# Patient Record
Sex: MALE | Race: WHITE | Employment: FULL TIME | URBAN - NONMETROPOLITAN AREA
[De-identification: names, ages, dates, MRNs, and addresses within clinical notes are randomized per-mention and may not be internally consistent; named-entity substitution may affect disease eponyms.]

---

## 2017-08-21 ENCOUNTER — HOSPITAL ENCOUNTER (EMERGENCY)
Age: 32
Discharge: HOME | End: 2017-08-21
Attending: EMERGENCY MEDICINE
Payer: COMMERCIAL

## 2017-08-21 VITALS
SYSTOLIC BLOOD PRESSURE: 136 MMHG | HEIGHT: 67 IN | HEART RATE: 110 BPM | WEIGHT: 205 LBS | DIASTOLIC BLOOD PRESSURE: 71 MMHG | BODY MASS INDEX: 32.18 KG/M2 | RESPIRATION RATE: 16 BRPM | TEMPERATURE: 98.1 F | OXYGEN SATURATION: 97 %

## 2017-08-21 DIAGNOSIS — H04.303: Primary | ICD-10-CM

## 2017-08-21 PROCEDURE — 99282 EMERGENCY DEPT VISIT SF MDM: CPT

## 2017-08-21 RX ORDER — GENTAMICIN SULFATE 3 MG/ML
1 SOLUTION/ DROPS OPHTHALMIC 4 TIMES DAILY
Qty: 1 BOTTLE | Refills: 0 | Status: SHIPPED | OUTPATIENT
Start: 2017-08-21 | End: 2017-08-31

## 2017-08-21 ASSESSMENT — ENCOUNTER SYMPTOMS
FACIAL SWELLING: 1
EYE DISCHARGE: 1
VOMITING: 0
COUGH: 0
SHORTNESS OF BREATH: 0
RHINORRHEA: 0
ABDOMINAL PAIN: 0
SORE THROAT: 0
BACK PAIN: 0
DIARRHEA: 0
NAUSEA: 0
EYE REDNESS: 1
WHEEZING: 0

## 2017-08-21 ASSESSMENT — VISUAL ACUITY
OD: 20/30
OU: 20/25
OS: 20/50

## 2018-03-12 ENCOUNTER — HOSPITAL ENCOUNTER (EMERGENCY)
Age: 33
Discharge: HOME | End: 2018-03-12
Payer: COMMERCIAL

## 2018-03-12 VITALS
TEMPERATURE: 97.7 F | WEIGHT: 217 LBS | OXYGEN SATURATION: 98 % | SYSTOLIC BLOOD PRESSURE: 123 MMHG | DIASTOLIC BLOOD PRESSURE: 81 MMHG | HEIGHT: 68 IN | HEART RATE: 68 BPM | RESPIRATION RATE: 16 BRPM | BODY MASS INDEX: 32.89 KG/M2

## 2018-03-12 DIAGNOSIS — H10.10 ALLERGIC CONJUNCTIVITIS AND RHINITIS, UNSPECIFIED LATERALITY: Primary | ICD-10-CM

## 2018-03-12 DIAGNOSIS — J30.9 ALLERGIC CONJUNCTIVITIS AND RHINITIS, UNSPECIFIED LATERALITY: Primary | ICD-10-CM

## 2018-03-12 LAB
BILIRUBIN URINE: NEGATIVE
BLOOD, URINE: NEGATIVE
CHARACTER, URINE: CLEAR
COLOR: YELLOW
GLUCOSE, URINE: NEGATIVE MG/DL
KETONES, URINE: NEGATIVE
LEUKOCYTES, UA: NEGATIVE
NITRATE, UA: NEGATIVE
PH UA: 7.5 (ref 5–9)
PROTEIN UA: NEGATIVE MG/DL
REFLEX TO URINE C & S: NORMAL
SPECIFIC GRAVITY UA: 1.02 (ref 1–1.03)
UROBILINOGEN, URINE: 0.2 EU/DL (ref 0–1)

## 2018-03-12 PROCEDURE — 99213 OFFICE O/P EST LOW 20 MIN: CPT

## 2018-03-12 PROCEDURE — 99213 OFFICE O/P EST LOW 20 MIN: CPT | Performed by: NURSE PRACTITIONER

## 2018-03-12 PROCEDURE — 81003 URINALYSIS AUTO W/O SCOPE: CPT

## 2018-03-12 RX ORDER — AZELASTINE HYDROCHLORIDE 0.5 MG/ML
1 SOLUTION/ DROPS OPHTHALMIC 2 TIMES DAILY
Qty: 1 BOTTLE | Refills: 1 | Status: SHIPPED | OUTPATIENT
Start: 2018-03-12 | End: 2018-04-11

## 2018-03-12 RX ORDER — CETIRIZINE HYDROCHLORIDE 10 MG/1
10 TABLET ORAL DAILY
Qty: 30 TABLET | Refills: 0 | Status: SHIPPED | OUTPATIENT
Start: 2018-03-12 | End: 2018-04-11

## 2018-03-12 ASSESSMENT — ENCOUNTER SYMPTOMS
EYE ITCHING: 0
BLIND SPOTS: 0
CHEST TIGHTNESS: 0
EYE INFLAMMATION: 1
SINUS PRESSURE: 0
NAUSEA: 0
WHEEZING: 0
CRUSTING: 1
EYE DISCHARGE: 0
DOUBLE VISION: 0
PERI-ORBITAL EDEMA: 1
SHORTNESS OF BREATH: 0
EYE WATERING: 1
PHOTOPHOBIA: 1
COUGH: 0
ABDOMINAL PAIN: 0
CHOKING: 0
BLURRED VISION: 0
EYE REDNESS: 0
STRIDOR: 0
VOMITING: 0
SINUS PAIN: 0
RHINORRHEA: 1

## 2019-02-25 ENCOUNTER — HOSPITAL ENCOUNTER (EMERGENCY)
Age: 34
Discharge: HOME | End: 2019-02-25
Payer: COMMERCIAL

## 2019-02-25 VITALS
BODY MASS INDEX: 30.61 KG/M2 | DIASTOLIC BLOOD PRESSURE: 72 MMHG | SYSTOLIC BLOOD PRESSURE: 133 MMHG | TEMPERATURE: 98.4 F | WEIGHT: 195 LBS | OXYGEN SATURATION: 96 % | RESPIRATION RATE: 16 BRPM | HEIGHT: 67 IN | HEART RATE: 73 BPM

## 2019-02-25 DIAGNOSIS — J06.9 ACUTE UPPER RESPIRATORY INFECTION: Primary | ICD-10-CM

## 2019-02-25 DIAGNOSIS — H93.8X1 CONGESTION OF RIGHT EAR: ICD-10-CM

## 2019-02-25 PROCEDURE — 99213 OFFICE O/P EST LOW 20 MIN: CPT | Performed by: NURSE PRACTITIONER

## 2019-02-25 PROCEDURE — 99212 OFFICE O/P EST SF 10 MIN: CPT

## 2019-02-25 RX ORDER — GUAIFENESIN 600 MG/1
1200 TABLET, EXTENDED RELEASE ORAL 2 TIMES DAILY
Qty: 40 TABLET | Refills: 0 | Status: SHIPPED | OUTPATIENT
Start: 2019-02-25 | End: 2019-03-07

## 2019-02-25 RX ORDER — LORATADINE 10 MG/1
10 TABLET ORAL DAILY
Qty: 30 TABLET | Refills: 0 | Status: SHIPPED | OUTPATIENT
Start: 2019-02-25 | End: 2019-07-08

## 2019-02-25 RX ORDER — DEXTROMETHORPHAN HYDROBROMIDE AND PROMETHAZINE HYDROCHLORIDE 15; 6.25 MG/5ML; MG/5ML
5 SYRUP ORAL 4 TIMES DAILY PRN
Qty: 120 ML | Refills: 0 | Status: SHIPPED | OUTPATIENT
Start: 2019-02-25 | End: 2019-03-04

## 2019-02-25 ASSESSMENT — ENCOUNTER SYMPTOMS
COUGH: 1
RHINORRHEA: 1
DIARRHEA: 0
NAUSEA: 0
WHEEZING: 0
SINUS PRESSURE: 1
VOMITING: 0
COLOR CHANGE: 0
SINUS PAIN: 0
SORE THROAT: 1
FACIAL SWELLING: 0
TROUBLE SWALLOWING: 0
SHORTNESS OF BREATH: 0

## 2019-07-08 ENCOUNTER — HOSPITAL ENCOUNTER (EMERGENCY)
Age: 34
Discharge: HOME | End: 2019-07-08
Payer: COMMERCIAL

## 2019-07-08 VITALS
OXYGEN SATURATION: 98 % | WEIGHT: 200 LBS | RESPIRATION RATE: 18 BRPM | BODY MASS INDEX: 31.39 KG/M2 | DIASTOLIC BLOOD PRESSURE: 94 MMHG | SYSTOLIC BLOOD PRESSURE: 154 MMHG | HEART RATE: 99 BPM | HEIGHT: 67 IN | TEMPERATURE: 97.7 F

## 2019-07-08 DIAGNOSIS — H92.01 OTALGIA OF RIGHT EAR: Primary | ICD-10-CM

## 2019-07-08 PROCEDURE — 6370000000 HC RX 637 (ALT 250 FOR IP): Performed by: NURSE PRACTITIONER

## 2019-07-08 PROCEDURE — 99282 EMERGENCY DEPT VISIT SF MDM: CPT

## 2019-07-08 RX ORDER — CETIRIZINE HYDROCHLORIDE 10 MG/1
10 TABLET ORAL DAILY
Qty: 30 TABLET | Refills: 0 | Status: SHIPPED | OUTPATIENT
Start: 2019-07-08 | End: 2021-09-21

## 2019-07-08 RX ORDER — NAPROXEN 500 MG/1
500 TABLET ORAL 2 TIMES DAILY
Qty: 60 TABLET | Refills: 0 | Status: SHIPPED | OUTPATIENT
Start: 2019-07-08 | End: 2021-09-21

## 2019-07-08 RX ORDER — NAPROXEN 250 MG/1
TABLET ORAL
Status: DISPENSED
Start: 2019-07-08 | End: 2019-07-08

## 2019-07-08 RX ORDER — NAPROXEN 250 MG/1
500 TABLET ORAL ONCE
Status: COMPLETED | OUTPATIENT
Start: 2019-07-08 | End: 2019-07-08

## 2019-07-08 RX ADMIN — NAPROXEN 500 MG: 250 TABLET ORAL at 03:04

## 2019-07-08 ASSESSMENT — ENCOUNTER SYMPTOMS
RHINORRHEA: 0
COUGH: 0
TROUBLE SWALLOWING: 0
SORE THROAT: 0
WHEEZING: 0
SHORTNESS OF BREATH: 0
CHEST TIGHTNESS: 0

## 2019-07-08 ASSESSMENT — PAIN DESCRIPTION - LOCATION: LOCATION: EAR

## 2019-07-08 ASSESSMENT — PAIN DESCRIPTION - ORIENTATION: ORIENTATION: RIGHT

## 2019-07-08 ASSESSMENT — PAIN SCALES - GENERAL
PAINLEVEL_OUTOF10: 4
PAINLEVEL_OUTOF10: 4

## 2019-07-08 NOTE — ED PROVIDER NOTES
[address] [facility] COMPLAINT       Chief Complaint   Patient presents with    Otalgia       Nurses Notes reviewed and I agree except as noted in the HPI. HISTORY OF PRESENT ILLNESS    Deirdre Mao is a 35 y.o. male who presents for bilateral ear pain right worse than left. She states that he has had an ear infection for the last 6 months. States that he was seen at urgent care and they placed him on Mucinex for his ear infection. Additionally patient states that he cannot get a PCP because he does not like going to the doctor during the day. And secondary to this he would like a prescription for Chantix. A record review shows that the patient was not diagnosed with an ear infection he was diagnosed with a middle ear effusion he was given the Mucinex for chest congestion and he was started on Claritin for the ear effusion. REVIEW OFSYSTEMS     Review of Systems   Constitutional: Negative for fever. HENT: Positive for ear pain. Negative for congestion, rhinorrhea, sore throat and trouble swallowing. Respiratory: Negative for cough, chest tightness, shortness of breath and wheezing. Cardiovascular: Negative for chest pain and palpitations. Musculoskeletal: Negative for arthralgias and myalgias. Neurological: Negative for headaches. Hematological: Does not bruise/bleed easily. PAST MEDICAL HISTORY    has a past medical history of Diverticular disease and Diverticulitis. SURGICAL HISTORY      has a past surgical history that includes cyst removal; other surgical history (6/11/14); and Ureter stent placement (6-11-14). CURRENTMEDICATIONS       Previous Medications    No medications on file       ALLERGIES     has No Known Allergies. FAMILY HISTORY     indicated that his mother is alive. He indicated that his father is alive. family history includes Other in his father. SOCIAL HISTORY      reports that he has been smoking cigarettes.   He

## 2019-08-02 ENCOUNTER — OFFICE VISIT (OUTPATIENT)
Dept: FAMILY MEDICINE CLINIC | Age: 34
End: 2019-08-02
Payer: COMMERCIAL

## 2019-08-02 VITALS
OXYGEN SATURATION: 97 % | DIASTOLIC BLOOD PRESSURE: 77 MMHG | HEART RATE: 70 BPM | HEIGHT: 66 IN | TEMPERATURE: 97.6 F | SYSTOLIC BLOOD PRESSURE: 129 MMHG | WEIGHT: 211.6 LBS | BODY MASS INDEX: 34.01 KG/M2

## 2019-08-02 DIAGNOSIS — R73.9 HYPERGLYCEMIA: ICD-10-CM

## 2019-08-02 DIAGNOSIS — Z11.4 SCREENING FOR HIV (HUMAN IMMUNODEFICIENCY VIRUS): ICD-10-CM

## 2019-08-02 DIAGNOSIS — F17.200 TOBACCO DEPENDENCE: ICD-10-CM

## 2019-08-02 DIAGNOSIS — H66.001 NON-RECURRENT ACUTE SUPPURATIVE OTITIS MEDIA OF RIGHT EAR WITHOUT SPONTANEOUS RUPTURE OF TYMPANIC MEMBRANE: Primary | ICD-10-CM

## 2019-08-02 DIAGNOSIS — Z28.9: ICD-10-CM

## 2019-08-02 PROCEDURE — 99203 OFFICE O/P NEW LOW 30 MIN: CPT | Performed by: FAMILY MEDICINE

## 2019-08-02 RX ORDER — VARENICLINE TARTRATE 1 MG/1
1 TABLET, FILM COATED ORAL 2 TIMES DAILY
Qty: 60 TABLET | Refills: 1 | Status: SHIPPED | OUTPATIENT
Start: 2019-08-02 | End: 2021-09-21

## 2019-08-02 RX ORDER — AMOXICILLIN AND CLAVULANATE POTASSIUM 875; 125 MG/1; MG/1
1 TABLET, FILM COATED ORAL 2 TIMES DAILY
Qty: 20 TABLET | Refills: 0 | Status: SHIPPED | OUTPATIENT
Start: 2019-08-02 | End: 2019-08-12

## 2019-08-02 RX ORDER — VARENICLINE TARTRATE 0.5 MG/1
.5-1 TABLET, FILM COATED ORAL SEE ADMIN INSTRUCTIONS
Qty: 57 TABLET | Refills: 0 | Status: SHIPPED | OUTPATIENT
Start: 2019-08-02 | End: 2021-09-21

## 2019-08-02 ASSESSMENT — ENCOUNTER SYMPTOMS
RESPIRATORY NEGATIVE: 1
NAUSEA: 1
ABDOMINAL PAIN: 1
SORE THROAT: 1
EYES NEGATIVE: 1

## 2019-08-02 ASSESSMENT — PATIENT HEALTH QUESTIONNAIRE - PHQ9
SUM OF ALL RESPONSES TO PHQ QUESTIONS 1-9: 0
SUM OF ALL RESPONSES TO PHQ9 QUESTIONS 1 & 2: 0
SUM OF ALL RESPONSES TO PHQ QUESTIONS 1-9: 0
2. FEELING DOWN, DEPRESSED OR HOPELESS: 0
1. LITTLE INTEREST OR PLEASURE IN DOING THINGS: 0

## 2019-08-02 NOTE — PROGRESS NOTES
2019    Gael Arciniega (:  1985) is a 35 y.o. male, here for evaluation of the following medical concerns:    HPI  Ear pain continuous for the past 1-2 months. Right worse than left. + pressure. + nasal congestion. + seasonal allergy issues. Smoking 1 Ppd. Wants to quit. chantix worked well in the past.      Hyperglycemia noted on labs in the past,. H/o diverticular disease with recent 2 hour daily episodes of LLQ pain. Review of Systems   Constitutional: Negative. HENT: Positive for congestion, ear discharge, ear pain and sore throat. Negative for dental problem. Eyes: Negative. Respiratory: Negative. Cardiovascular: Negative. Gastrointestinal: Positive for abdominal pain and nausea. Genitourinary: Negative. Musculoskeletal: Negative. Skin: Negative. Neurological: Negative. Psychiatric/Behavioral: Negative. Prior to Visit Medications    Medication Sig Taking?  Authorizing Provider   varenicline (CHANTIX) 0.5 MG tablet Take 1-2 tablets by mouth See Admin Instructions 0.5mg DAILY for 3 days followed by 0.5mg TWICE DAILY for 4 days followed by 1mg TWICE DAILY Yes Mariposa Lutz DO   varenicline (CHANTIX) 1 MG tablet Take 1 tablet by mouth 2 times daily Yes Mariposa Lutz DO   amoxicillin-clavulanate (AUGMENTIN) 875-125 MG per tablet Take 1 tablet by mouth 2 times daily for 10 days Yes Mariposa Lutz DO   naproxen (NAPROSYN) 500 MG tablet Take 1 tablet by mouth 2 times daily  Patient not taking: Reported on 2019  JAMI Marquez CNP   cetirizine (ZYRTEC) 10 MG tablet Take 1 tablet by mouth daily  Patient not taking: Reported on 2019  JAMI Marquez CNP        No Known Allergies    Past Medical History:   Diagnosis Date    Diverticular disease 2008    Diverticulitis        Past Surgical History:   Procedure Laterality Date    CYST REMOVAL      neck    OTHER SURGICAL HISTORY  14    Robot Assisted Sigmoid Colectomy,

## 2019-08-02 NOTE — PROGRESS NOTES
Health Maintenance Due   Topic Date Due    Pneumococcal 0-64 years Vaccine (1 of 1 - PPSV23) 09/25/1991 REFUSED     Varicella Vaccine (1 of 2 - 13+ 2-dose series) 09/25/1998 PENDING     HIV screen  09/25/2000 PENDING     DTaP/Tdap/Td vaccine (1 - Tdap) 09/25/2004 REFSUED

## 2019-09-04 RX ORDER — VARENICLINE TARTRATE 0.5 MG/1
.5-1 TABLET, FILM COATED ORAL SEE ADMIN INSTRUCTIONS
Qty: 57 TABLET | Refills: 0 | OUTPATIENT
Start: 2019-09-04

## 2020-01-06 ENCOUNTER — TELEPHONE (OUTPATIENT)
Dept: FAMILY MEDICINE CLINIC | Age: 35
End: 2020-01-06

## 2020-01-06 ENCOUNTER — HOSPITAL ENCOUNTER (EMERGENCY)
Age: 35
Discharge: HOME | End: 2020-01-06
Attending: EMERGENCY MEDICINE
Payer: MEDICAID

## 2020-01-06 VITALS
HEIGHT: 67 IN | TEMPERATURE: 97.8 F | RESPIRATION RATE: 16 BRPM | SYSTOLIC BLOOD PRESSURE: 128 MMHG | WEIGHT: 200 LBS | BODY MASS INDEX: 31.39 KG/M2 | OXYGEN SATURATION: 97 % | DIASTOLIC BLOOD PRESSURE: 76 MMHG | HEART RATE: 98 BPM

## 2020-01-06 LAB
AMPHETAMINE+METHAMPHETAMINE URINE SCREEN: NEGATIVE
BARBITURATE QUANTITATIVE URINE: NEGATIVE
BENZODIAZEPINE QUANTITATIVE URINE: NEGATIVE
BILIRUBIN URINE: NEGATIVE
BLOOD, URINE: NEGATIVE
CANNABINOID QUANTITATIVE URINE: POSITIVE
CHARACTER, URINE: CLEAR
CHLAMYDIA TRACHOMATIS BY RT-PCR: NOT DETECTED
COCAINE METABOLITE QUANTITATIVE URINE: NEGATIVE
COLOR: YELLOW
CT/NG SOURCE: NORMAL
GLUCOSE URINE: NEGATIVE MG/DL
KETONES, URINE: NEGATIVE
LEUKOCYTE ESTERASE, URINE: NEGATIVE
NEISSERIA GONORRHOEAE BY RT-PCR: NOT DETECTED
NITRITE, URINE: NEGATIVE
OPIATES, URINE: NEGATIVE
OXYCODONE: NEGATIVE
PH UA: 5.5 (ref 5–9)
PHENCYCLIDINE QUANTITATIVE URINE: NEGATIVE
PROTEIN UA: NEGATIVE
SPECIFIC GRAVITY, URINE: 1.03 (ref 1–1.03)
UROBILINOGEN, URINE: 0.2 EU/DL (ref 0–1)

## 2020-01-06 PROCEDURE — 96372 THER/PROPH/DIAG INJ SC/IM: CPT

## 2020-01-06 PROCEDURE — 87491 CHLMYD TRACH DNA AMP PROBE: CPT

## 2020-01-06 PROCEDURE — 6370000000 HC RX 637 (ALT 250 FOR IP): Performed by: EMERGENCY MEDICINE

## 2020-01-06 PROCEDURE — 87591 N.GONORRHOEAE DNA AMP PROB: CPT

## 2020-01-06 PROCEDURE — 81003 URINALYSIS AUTO W/O SCOPE: CPT

## 2020-01-06 PROCEDURE — 2500000003 HC RX 250 WO HCPCS: Performed by: EMERGENCY MEDICINE

## 2020-01-06 PROCEDURE — 80307 DRUG TEST PRSMV CHEM ANLYZR: CPT

## 2020-01-06 PROCEDURE — 6360000002 HC RX W HCPCS: Performed by: EMERGENCY MEDICINE

## 2020-01-06 PROCEDURE — 99283 EMERGENCY DEPT VISIT LOW MDM: CPT

## 2020-01-06 RX ORDER — ONDANSETRON 4 MG/1
4 TABLET, ORALLY DISINTEGRATING ORAL ONCE
Status: COMPLETED | OUTPATIENT
Start: 2020-01-06 | End: 2020-01-06

## 2020-01-06 RX ORDER — AZITHROMYCIN 250 MG/1
1000 TABLET, FILM COATED ORAL ONCE
Status: COMPLETED | OUTPATIENT
Start: 2020-01-06 | End: 2020-01-06

## 2020-01-06 RX ADMIN — ONDANSETRON 4 MG: 4 TABLET, ORALLY DISINTEGRATING ORAL at 01:43

## 2020-01-06 RX ADMIN — AZITHROMYCIN 1000 MG: 250 TABLET, FILM COATED ORAL at 01:42

## 2020-01-06 RX ADMIN — LIDOCAINE HYDROCHLORIDE 250 MG: 10 INJECTION, SOLUTION EPIDURAL; INFILTRATION; INTRACAUDAL; PERINEURAL at 01:43

## 2020-01-06 ASSESSMENT — ENCOUNTER SYMPTOMS
SORE THROAT: 0
CHOKING: 0
VOICE CHANGE: 0
BLOOD IN STOOL: 0
DIARRHEA: 0
PHOTOPHOBIA: 0
SINUS PRESSURE: 0
EYE PAIN: 0
CONSTIPATION: 0
NAUSEA: 0
RHINORRHEA: 0
BACK PAIN: 0
VOMITING: 0
SHORTNESS OF BREATH: 0
CHEST TIGHTNESS: 0
TROUBLE SWALLOWING: 0
EYE REDNESS: 0
COUGH: 0
EYE DISCHARGE: 0
WHEEZING: 0
ABDOMINAL DISTENTION: 0
EYE ITCHING: 0
ABDOMINAL PAIN: 1

## 2020-01-06 NOTE — ED PROVIDER NOTES
Does not bruise/bleed easily. Psychiatric/Behavioral: Negative for agitation, hallucinations and suicidal ideas. The patient is not nervous/anxious. PAST MEDICAL HISTORY    has a past medical history of Diverticular disease and Diverticulitis. SURGICAL HISTORY      has a past surgical history that includes cyst removal; other surgical history (14); and Ureter stent placement (14). CURRENT MEDICATIONS       Previous Medications    CETIRIZINE (ZYRTEC) 10 MG TABLET    Take 1 tablet by mouth daily    NAPROXEN (NAPROSYN) 500 MG TABLET    Take 1 tablet by mouth 2 times daily    VARENICLINE (CHANTIX) 0.5 MG TABLET    Take 1-2 tablets by mouth See Admin Instructions 0.5mg DAILY for 3 days followed by 0.5mg TWICE DAILY for 4 days followed by 1mg TWICE DAILY    VARENICLINE (CHANTIX) 1 MG TABLET    Take 1 tablet by mouth 2 times daily       ALLERGIES     has No Known Allergies. FAMILY HISTORY     He indicated that his mother is alive. He indicated that his father is . He indicated that his sister is alive. family history includes No Known Problems in his mother and sister; Other in his father. SOCIAL HISTORY    reports that he has been smoking cigarettes. He started smoking about 21 years ago. He has a 20.00 pack-year smoking history. He has never used smokeless tobacco. He reports previous drug use. Drug: Marijuana. He reports that he does not drink alcohol. PHYSICAL EXAM     INITIAL VITALS:  height is 5' 7\" (1.702 m) and weight is 200 lb (90.7 kg). His oral temperature is 97.8 °F (36.6 °C). His blood pressure is 128/76 and his pulse is 98. His respiration is 16 and oxygen saturation is 97%. Physical Exam  Vitals signs and nursing note reviewed. Constitutional:       General: He is not in acute distress. Appearance: He is well-developed. He is not diaphoretic. HENT:      Head: Normocephalic and atraumatic.       Right Ear: External ear normal.      Left Ear: External ear normal.      Nose: Nose normal.   Eyes:      General: Lids are normal. No scleral icterus. Right eye: No discharge. Left eye: No discharge. Conjunctiva/sclera: Conjunctivae normal.      Right eye: No exudate. Left eye: No exudate. Pupils: Pupils are equal, round, and reactive to light. Neck:      Musculoskeletal: Normal range of motion and neck supple. Normal range of motion. Thyroid: No thyromegaly. Vascular: No JVD. Trachea: No tracheal deviation. Cardiovascular:      Rate and Rhythm: Normal rate and regular rhythm. Pulses: Normal pulses. Heart sounds: Normal heart sounds, S1 normal and S2 normal. No murmur. No friction rub. No gallop. Pulmonary:      Effort: Pulmonary effort is normal. No respiratory distress. Breath sounds: Normal breath sounds. No stridor. No wheezing or rales. Chest:      Chest wall: No tenderness. Abdominal:      General: Bowel sounds are normal. There is no distension. Palpations: Abdomen is soft. There is no mass. Tenderness: There is no tenderness. There is no guarding or rebound. Musculoskeletal: Normal range of motion. General: No tenderness. Right shoulder: He exhibits no tenderness, no bony tenderness, no crepitus and normal strength. Lymphadenopathy:      Cervical: No cervical adenopathy. Skin:     General: Skin is warm and dry. Findings: No bruising, ecchymosis, lesion or rash. Neurological:      Mental Status: He is alert and oriented to person, place, and time. Cranial Nerves: No cranial nerve deficit. Sensory: No sensory deficit. Coordination: Coordination normal.      Deep Tendon Reflexes: Reflexes are normal and symmetric. Psychiatric:         Speech: Speech normal.         Behavior: Behavior normal.         Thought Content:  Thought content normal.         Judgment: Judgment normal.           DIFFERENTIAL DIAGNOSIS:   Differential diagnoses were discussed extensively with the patient and family including but no limited to STD, UTI, less likely testicular torsion. DIAGNOSTIC RESULTS     EKG: All EKG's are interpreted by the Emergency Department Physician who either signs or Co-signs this chart in the absence of a cardiologist.  EKG interpreted by Ladoris Solders, DO:    none        RADIOLOGY: non-plain film images(s) such as CT, Ultrasound and MRI are read by the radiologist.    No orders to display       LABS:   Labs Reviewed   C. TRACHOMATIS / Parisa Terrazas, DNA   URINE RT REFLEX TO CULTURE   URINE DRUG SCREEN       EMERGENCY DEPARTMENT COURSE:   Vitals:    Vitals:    01/06/20 0133 01/06/20 0138   BP:  128/76   Pulse:  98   Resp:  16   Temp: 97.8 °F (36.6 °C)    TempSrc: Oral    SpO2:  97%   Weight: 200 lb (90.7 kg)    Height: 5' 7\" (1.702 m)      1:37 AM: The patient was seen andevaluated. Appropriate labs were ordered and reviewed. Patient had a possible exposure to an STD. Patient wants tested here. He also wants to be treated. Patient was given azithromycin and Rocephin here. He was instructed that he could follow-up for the results in the next 2 to 3 days. If he wishes to have further testing he should follow-up with [address] [place] [address] as they will provide this testing for him. Patient understood and agreed with the plan. Patient is subsequently discharged home in good condition. Patient had a supposed exposure to an STD. Patient has been treated here. Patient is instructed to follow-up with his primary care physician for the results of the tests in the next 2 to 3 days. Or call here at [facility]. If patient wants to do further testing [address] [place] [address] should be called and he should schedule a date for for the further testing. Patient is instructed to return to the nearest emergency room immediately for any new or worsening complaints.       CRITICALCARE:   none

## 2020-01-07 ENCOUNTER — TELEPHONE (OUTPATIENT)
Dept: FAMILY MEDICINE CLINIC | Age: 35
End: 2020-01-07

## 2020-01-09 ENCOUNTER — TELEPHONE (OUTPATIENT)
Dept: FAMILY MEDICINE CLINIC | Age: 35
End: 2020-01-09

## 2021-09-21 ENCOUNTER — HOSPITAL ENCOUNTER (EMERGENCY)
Age: 36
Discharge: HOME | End: 2021-09-21
Attending: EMERGENCY MEDICINE
Payer: COMMERCIAL

## 2021-09-21 VITALS
DIASTOLIC BLOOD PRESSURE: 88 MMHG | HEART RATE: 95 BPM | BODY MASS INDEX: 32.18 KG/M2 | RESPIRATION RATE: 18 BRPM | OXYGEN SATURATION: 98 % | WEIGHT: 205 LBS | HEIGHT: 67 IN | SYSTOLIC BLOOD PRESSURE: 133 MMHG | TEMPERATURE: 99.2 F

## 2021-09-21 DIAGNOSIS — Z20.2 POTENTIAL EXPOSURE TO STD: Primary | ICD-10-CM

## 2021-09-21 LAB
BACTERIA: ABNORMAL /HPF
BILIRUBIN URINE: NEGATIVE
BLOOD, URINE: NEGATIVE
CASTS 2: ABNORMAL /LPF
CASTS UA: ABNORMAL /LPF
CHARACTER, URINE: CLEAR
CHLAMYDIA TRACHOMATIS BY RT-PCR: NOT DETECTED
COLOR: YELLOW
CRYSTALS, UA: ABNORMAL
CT/NG SOURCE: NORMAL
EPITHELIAL CELLS, UA: ABNORMAL /HPF
GLUCOSE URINE: NEGATIVE MG/DL
KETONES, URINE: ABNORMAL
LEUKOCYTE ESTERASE, URINE: NEGATIVE
MISCELLANEOUS 2: ABNORMAL
NEISSERIA GONORRHOEAE BY RT-PCR: NOT DETECTED
NITRITE, URINE: NEGATIVE
PH UA: 6 (ref 5–9)
PROTEIN UA: ABNORMAL
RBC URINE: ABNORMAL /HPF
RENAL EPITHELIAL, UA: ABNORMAL
SPECIFIC GRAVITY, URINE: > 1.03 (ref 1–1.03)
UROBILINOGEN, URINE: 1 EU/DL (ref 0–1)
WBC UA: ABNORMAL /HPF
YEAST: ABNORMAL

## 2021-09-21 PROCEDURE — 6360000002 HC RX W HCPCS: Performed by: EMERGENCY MEDICINE

## 2021-09-21 PROCEDURE — 96372 THER/PROPH/DIAG INJ SC/IM: CPT

## 2021-09-21 PROCEDURE — 81001 URINALYSIS AUTO W/SCOPE: CPT

## 2021-09-21 PROCEDURE — 87491 CHLMYD TRACH DNA AMP PROBE: CPT

## 2021-09-21 PROCEDURE — 2580000003 HC RX 258

## 2021-09-21 PROCEDURE — 6370000000 HC RX 637 (ALT 250 FOR IP): Performed by: EMERGENCY MEDICINE

## 2021-09-21 PROCEDURE — 99283 EMERGENCY DEPT VISIT LOW MDM: CPT

## 2021-09-21 PROCEDURE — 87591 N.GONORRHOEAE DNA AMP PROB: CPT

## 2021-09-21 RX ORDER — DOXYCYCLINE HYCLATE 100 MG/1
100 CAPSULE ORAL 2 TIMES DAILY
Qty: 20 CAPSULE | Refills: 0 | Status: SHIPPED | OUTPATIENT
Start: 2021-09-21 | End: 2021-10-01

## 2021-09-21 RX ORDER — CEFTRIAXONE 1 G/1
1000 INJECTION, POWDER, FOR SOLUTION INTRAMUSCULAR; INTRAVENOUS ONCE
Status: COMPLETED | OUTPATIENT
Start: 2021-09-21 | End: 2021-09-21

## 2021-09-21 RX ORDER — DOXYCYCLINE HYCLATE 100 MG
100 TABLET ORAL ONCE
Status: COMPLETED | OUTPATIENT
Start: 2021-09-21 | End: 2021-09-21

## 2021-09-21 RX ADMIN — DOXYCYCLINE HYCLATE 100 MG: 100 TABLET, COATED ORAL at 03:55

## 2021-09-21 RX ADMIN — WATER 2.1 ML: 1 INJECTION INTRAMUSCULAR; INTRAVENOUS; SUBCUTANEOUS at 03:54

## 2021-09-21 RX ADMIN — CEFTRIAXONE SODIUM 1000 MG: 1 INJECTION, POWDER, FOR SOLUTION INTRAMUSCULAR; INTRAVENOUS at 03:55

## 2021-09-21 ASSESSMENT — ENCOUNTER SYMPTOMS
EYE PAIN: 0
EYE REDNESS: 0
CHEST TIGHTNESS: 0
CHOKING: 0
PHOTOPHOBIA: 0
CONSTIPATION: 0
RHINORRHEA: 0
DIARRHEA: 0
VOICE CHANGE: 0
COUGH: 0
ABDOMINAL DISTENTION: 0
VOMITING: 0
WHEEZING: 0
ABDOMINAL PAIN: 0
SINUS PRESSURE: 0
SHORTNESS OF BREATH: 0
BLOOD IN STOOL: 0
BACK PAIN: 0
NAUSEA: 0
SORE THROAT: 0
TROUBLE SWALLOWING: 0
EYE ITCHING: 0
EYE DISCHARGE: 0

## 2021-09-21 ASSESSMENT — PAIN DESCRIPTION - LOCATION: LOCATION: FLANK

## 2021-09-21 ASSESSMENT — PAIN SCALES - GENERAL: PAINLEVEL_OUTOF10: 6

## 2021-09-21 ASSESSMENT — PAIN DESCRIPTION - PAIN TYPE: TYPE: ACUTE PAIN

## 2021-09-21 ASSESSMENT — PAIN DESCRIPTION - FREQUENCY: FREQUENCY: CONTINUOUS

## 2021-09-21 ASSESSMENT — PAIN DESCRIPTION - DESCRIPTORS: DESCRIPTORS: PATIENT UNABLE TO DESCRIBE

## 2021-09-21 ASSESSMENT — PAIN DESCRIPTION - ORIENTATION: ORIENTATION: LEFT

## 2021-09-21 NOTE — ED PROVIDER NOTES
[facility]  eMERGENCY dEPARTMENT eNCOUnter          [address] [facility]       Chief Complaint   Patient presents with    Flank Pain    Other     Urinary Symptoms       Nurses Notes reviewed and I agree except as noted in the HPI. HISTORY OF PRESENT ILLNESS    Meg Hickman is a 28 y.o. male who presents burning with urination. Apparently he thought he had a kidney stone but he relates to me that he had sex with a woman that stent that she had a \"kidney infection\". Now he is worried that he has an STD. Patient recently had sex with this lady within the last 72 hours. Is burning started in the last 2 days. Patient does not have any discharge from his penis. He is not having any penile pain. He is having no testicular pain. He has no flank pain. Currently the patient is resting comfortably on the cot no apparent distress no other physical complaints at this time. I had a long discussion with him about the treatment plan. We will test him for chlamydia and gonorrhea. I told him that if he was in need of having testing for syphilis, herpes or AIDS he would have to be tested at the health department. Patient understood and agreed with this course of care. REVIEW OF SYSTEMS     Review of Systems   Constitutional: Negative for activity change, appetite change, diaphoresis, fatigue and unexpected weight change. HENT: Negative for congestion, ear discharge, ear pain, hearing loss, rhinorrhea, sinus pressure, sore throat, trouble swallowing and voice change. Eyes: Negative for photophobia, pain, discharge, redness and itching. Respiratory: Negative for cough, choking, chest tightness, shortness of breath and wheezing. Cardiovascular: Negative for chest pain, palpitations and leg swelling. Gastrointestinal: Negative for abdominal distention, abdominal pain, blood in stool, constipation, diarrhea, nausea and vomiting. Endocrine: Negative for polydipsia, polyphagia and polyuria. Genitourinary: Positive for dysuria. Negative for decreased urine volume, difficulty urinating, discharge, enuresis, frequency, hematuria, penile pain, penile swelling, scrotal swelling, testicular pain and urgency. Musculoskeletal: Negative for arthralgias, back pain, gait problem, myalgias, neck pain and neck stiffness. Skin: Negative for pallor and rash. Allergic/Immunologic: Negative for immunocompromised state. Neurological: Negative for dizziness, tremors, seizures, syncope, facial asymmetry, weakness, light-headedness, numbness and headaches. Hematological: Negative for adenopathy. Does not bruise/bleed easily. Psychiatric/Behavioral: Negative for agitation, hallucinations and suicidal ideas. The patient is not nervous/anxious. PAST MEDICAL HISTORY    has a past medical history of Diverticular disease and Diverticulitis. SURGICAL HISTORY      has a past surgical history that includes cyst removal; other surgical history (14); and Ureter stent placement (14). CURRENT MEDICATIONS       Previous Medications    No medications on file       ALLERGIES     has No Known Allergies. FAMILY HISTORY     He indicated that his mother is alive. He indicated that his father is . He indicated that his sister is alive. family history includes No Known Problems in his mother and sister; Other in his father. SOCIAL HISTORY      reports that he has been smoking cigarettes. He started smoking about 22 years ago. He has a 20.00 pack-year smoking history. He has never used smokeless tobacco. He reports current alcohol use. He reports previous drug use. Drug: Marijuana. PHYSICAL EXAM     INITIAL VITALS:  height is 5' 7\" (1.702 m) and weight is 205 lb (93 kg). His oral temperature is 99.2 °F (37.3 °C). His blood pressure is 133/88 and his pulse is 95. His respiration is 18 and oxygen saturation is 98%. Physical Exam  Vitals and nursing note reviewed.    Constitutional: General: He is not in acute distress. Appearance: He is well-developed. He is not diaphoretic. HENT:      Head: Normocephalic and atraumatic. Right Ear: External ear normal.      Left Ear: External ear normal.      Nose: Nose normal.   Eyes:      General: Lids are normal. No scleral icterus. Right eye: No discharge. Left eye: No discharge. Conjunctiva/sclera: Conjunctivae normal.      Right eye: No exudate. Left eye: No exudate. Pupils: Pupils are equal, round, and reactive to light. Neck:      Thyroid: No thyromegaly. Vascular: No JVD. Trachea: No tracheal deviation. Cardiovascular:      Rate and Rhythm: Normal rate and regular rhythm. Pulses: Normal pulses. Heart sounds: Normal heart sounds, S1 normal and S2 normal. No murmur heard. No friction rub. No gallop. Pulmonary:      Effort: Pulmonary effort is normal. No respiratory distress. Breath sounds: Normal breath sounds. No stridor. No wheezing or rales. Chest:      Chest wall: No tenderness. Abdominal:      General: Bowel sounds are normal. There is no distension. Palpations: Abdomen is soft. There is no mass. Tenderness: There is no abdominal tenderness. There is no guarding or rebound. Genitourinary:     Penis: Normal and circumcised. Testes: Normal. Cremasteric reflex is present. Epididymis:      Right: Normal.      Left: Normal.   Musculoskeletal:         General: No tenderness. Normal range of motion. Cervical back: Normal range of motion and neck supple. Normal range of motion. Lymphadenopathy:      Cervical: No cervical adenopathy. Skin:     General: Skin is warm and dry. Findings: No bruising, ecchymosis, lesion or rash. Neurological:      Mental Status: He is alert and oriented to person, place, and time. Cranial Nerves: No cranial nerve deficit. Sensory: No sensory deficit.       Coordination: Coordination normal.      Deep Tendon Reflexes: Reflexes are normal and symmetric. Psychiatric:         Speech: Speech normal.         Behavior: Behavior normal.         Thought Content: Thought content normal.         Judgment: Judgment normal.           DIFFERENTIAL DIAGNOSIS:   Urinary tract infection STD    DIAGNOSTIC RESULTS     EKG: All EKG's are interpreted by the Emergency Department Physician who either signs or Co-signs this chart in the absence of a cardiologist.  None    RADIOLOGY: non-plain film images(s) such as CT, Ultrasound and MRI are read by the radiologist.  None. LABS:   Labs Reviewed   URINE WITH REFLEXED MICRO - Abnormal; Notable for the following components:       Result Value    Ketones, Urine TRACE (*)     Specific Gravity, Urine > 1.030 (*)     Protein, UA TRACE (*)     All other components within normal limits   C. TRACHOMATIS / N. GONORRHOEAE, DNA       EMERGENCY DEPARTMENT COURSE:   Vitals:    Vitals:    09/21/21 0301 09/21/21 0304   BP:  133/88   Pulse:  95   Resp:  18   Temp:  99.2 °F (37.3 °C)   TempSrc:  Oral   SpO2:  98%   Weight: 205 lb (93 kg)    Height: 5' 7\" (1.702 m)      Patient was assessed at bedside decision to treat was made. Patient did get antibiotics here. His urine was sent. He was given 7 days of doxycycline for at home. Patient is instructed to follow-up with the health department. Should he want any further testing. This was discussed extensively at bedside with the patient who understood and agreed with plan. Patient is subsequently discharged home in stable condition. Patient presented today for STD evaluation. Patient has been given antibiotics he is instructed to take those as prescribed. He is instructed to call in 2 to 3 days for results of his urine test.  He is instructed to follow-up with bryan Hamilton and call for an appointment within the next 1 to 2 days for any testing for syphilis herpes or AIDS.   Patient is instructed to return to the nearest emergency room immediately for any new or worsening complaints    CRITICAL CARE:   None    CONSULTS:  None    PROCEDURES:  None    FINAL IMPRESSION      1.  Potential exposure to STD          DISPOSITION/PLAN   Discharge    PATIENT REFERRED TO:  [facility] Department  [address] [facility] [place] [address]  [phone]    Call in 2 days  For testing for syphilis herpes or AIDS      DISCHARGE MEDICATIONS:  New Prescriptions    DOXYCYCLINE HYCLATE (VIBRAMYCIN) 100 MG CAPSULE    Take 1 capsule by mouth 2 times daily for 10 days       (Please note that portions of this note were completed with a voice recognition program.  Efforts were made to edit the dictations but occasionally words are mis-transcribed.)    [facility], [address] [facility],   09/21/21 [id number]

## 2021-09-21 NOTE — ED TRIAGE NOTES
Patient ambulatory with steady gait; c/o flank pain x3 days, burning with urination. Pt states he took ibuprofen with some relief. Pt with concern for possible kidney stones. Denies hematuria, nausea, vomiting, history of kidney stones.

## 2021-09-21 NOTE — ED NOTES
Patient had vasovagal syncope from sitting position soon after IM rocephin. Patient fell forward; RN able to catch patient's head before hitting the floor. Patient hit his left eye onto this RN's shoes. Patient woke up in less than a minute, unable to recall events. Patient assisted to bed; states left eye is sore. Dr Maria Elena Lindo aware.      April Miller, SHAISTA  09/21/21 6531

## 2021-10-06 ENCOUNTER — TELEPHONE (OUTPATIENT)
Dept: FAMILY MEDICINE CLINIC | Age: 36
End: 2021-10-06

## 2021-10-06 NOTE — TELEPHONE ENCOUNTER
----- Message from Adelaide Baires sent at 10/6/2021 12:42 PM EDT -----  Subject: Message to Provider    QUESTIONS  Information for Provider? Patient's provider listed as Dr. Raf Palumbo but he   saw Dr. Larose Schwab before and wants to keep Dr. Larose Schwab as his primary care   provider. He wants to schedule an appt with Dr. Larose Schwab now.   ---------------------------------------------------------------------------  --------------  [address]  What is the best way for the office to contact you? OK to leave message on   voicemail  Preferred Call Back Phone Number? [phone]  ---------------------------------------------------------------------------  --------------  SCRIPT ANSWERS  Relationship to Patient?  Self

## 2021-10-12 ENCOUNTER — NURSE ONLY (OUTPATIENT)
Dept: LAB | Age: 36
End: 2021-10-12

## 2021-10-12 ENCOUNTER — OFFICE VISIT (OUTPATIENT)
Dept: FAMILY MEDICINE CLINIC | Age: 36
End: 2021-10-12
Payer: COMMERCIAL

## 2021-10-12 VITALS
HEART RATE: 97 BPM | WEIGHT: 219.6 LBS | HEIGHT: 67 IN | TEMPERATURE: 97.1 F | SYSTOLIC BLOOD PRESSURE: 122 MMHG | DIASTOLIC BLOOD PRESSURE: 78 MMHG | OXYGEN SATURATION: 98 % | BODY MASS INDEX: 34.47 KG/M2

## 2021-10-12 DIAGNOSIS — Z72.51 HIGH RISK SEXUAL BEHAVIOR, UNSPECIFIED TYPE: ICD-10-CM

## 2021-10-12 DIAGNOSIS — N48.5 ULCER OF PENIS: Primary | ICD-10-CM

## 2021-10-12 DIAGNOSIS — N48.5 ULCER OF PENIS: ICD-10-CM

## 2021-10-12 LAB
HEPATITIS B SURFACE ANTIGEN: NEGATIVE
HEPATITIS C ANTIBODY: NEGATIVE

## 2021-10-12 PROCEDURE — 99213 OFFICE O/P EST LOW 20 MIN: CPT | Performed by: STUDENT IN AN ORGANIZED HEALTH CARE EDUCATION/TRAINING PROGRAM

## 2021-10-12 PROCEDURE — 4004F PT TOBACCO SCREEN RCVD TLK: CPT | Performed by: STUDENT IN AN ORGANIZED HEALTH CARE EDUCATION/TRAINING PROGRAM

## 2021-10-12 PROCEDURE — G8427 DOCREV CUR MEDS BY ELIG CLIN: HCPCS | Performed by: STUDENT IN AN ORGANIZED HEALTH CARE EDUCATION/TRAINING PROGRAM

## 2021-10-12 PROCEDURE — G8417 CALC BMI ABV UP PARAM F/U: HCPCS | Performed by: STUDENT IN AN ORGANIZED HEALTH CARE EDUCATION/TRAINING PROGRAM

## 2021-10-12 PROCEDURE — G8484 FLU IMMUNIZE NO ADMIN: HCPCS | Performed by: STUDENT IN AN ORGANIZED HEALTH CARE EDUCATION/TRAINING PROGRAM

## 2021-10-12 SDOH — ECONOMIC STABILITY: FOOD INSECURITY: WITHIN THE PAST 12 MONTHS, YOU WORRIED THAT YOUR FOOD WOULD RUN OUT BEFORE YOU GOT MONEY TO BUY MORE.: NEVER TRUE

## 2021-10-12 SDOH — ECONOMIC STABILITY: FOOD INSECURITY: WITHIN THE PAST 12 MONTHS, THE FOOD YOU BOUGHT JUST DIDN'T LAST AND YOU DIDN'T HAVE MONEY TO GET MORE.: NEVER TRUE

## 2021-10-12 ASSESSMENT — PATIENT HEALTH QUESTIONNAIRE - PHQ9
2. FEELING DOWN, DEPRESSED OR HOPELESS: 0
SUM OF ALL RESPONSES TO PHQ QUESTIONS 1-9: 0
SUM OF ALL RESPONSES TO PHQ9 QUESTIONS 1 & 2: 0
1. LITTLE INTEREST OR PLEASURE IN DOING THINGS: 0
SUM OF ALL RESPONSES TO PHQ QUESTIONS 1-9: 0
SUM OF ALL RESPONSES TO PHQ QUESTIONS 1-9: 0

## 2021-10-12 ASSESSMENT — SOCIAL DETERMINANTS OF HEALTH (SDOH): HOW HARD IS IT FOR YOU TO PAY FOR THE VERY BASICS LIKE FOOD, HOUSING, MEDICAL CARE, AND HEATING?: NOT HARD AT ALL

## 2021-10-12 NOTE — PROGRESS NOTES
S: 39 y.o. male with   Chief Complaint   Patient presents with    Follow-up     ER 9/21/21, rash- discuss medication thinks its a possible SE    Labs Only     9/21/21-    Discuss Medications     takes testosterone booster-     Results     wants lab for syphillis       Penile lesion:  Small lesion on ventral side of penis  Shallow ulcer. No drainage. No sig open  No pain  Unsafe sexually practices  Had neg GC/Chlamydia in ED 9/21  Treated with Rocephin and doxy  No other sxs  No previous hx of syphillis    BP Readings from Last 3 Encounters:   10/12/21 122/78   09/21/21 133/88   01/06/20 128/76     Wt Readings from Last 3 Encounters:   10/12/21 219 lb 9.6 oz (99.6 kg)   09/21/21 205 lb (93 kg)   01/06/20 200 lb (90.7 kg)       O: VS:  height is 5' 7\" (1.702 m) and weight is 219 lb 9.6 oz (99.6 kg). His skin temperature is 97.1 °F (36.2 °C). His blood pressure is 122/78 and his pulse is 97. His oxygen saturation is 98%. No visits with results within 3 Week(s) from this visit.    Latest known visit with results is:   Admission on 09/21/2021, Discharged on 09/21/2021   Component Date Value Ref Range Status    Chlamydia Trachomatis By RT-PCR 09/21/2021 NOT DETECTED   Final    Neisseria Gonorrhoeae By RT-PCR 09/21/2021 NOT DETECTED   Final    CT/NG SOURCE 09/21/2021 URINE   Final    Glucose, Ur 09/21/2021 NEGATIVE  NEGATIVE mg/dl Final    Bilirubin Urine 09/21/2021 NEGATIVE  NEGATIVE Final    Ketones, Urine 09/21/2021 TRACE* NEGATIVE Final    Specific Gravity, Urine 09/21/2021 > 1.030* 1.002 - 1.030 Final    Blood, Urine 09/21/2021 NEGATIVE  NEGATIVE Final    pH, UA 09/21/2021 6.0  5.0 - 9.0 Final    Protein, UA 09/21/2021 TRACE* NEGATIVE Final    Urobilinogen, Urine 09/21/2021 1.0  0.0 - 1.0 eu/dl Final    Nitrite, Urine 09/21/2021 NEGATIVE  NEGATIVE Final    Leukocyte Esterase, Urine 09/21/2021 NEGATIVE  NEGATIVE Final    Color, UA 09/21/2021 YELLOW  STRAW-YELLOW Final    Character, Urine 09/21/2021 CLEAR  CLEAR-SL CLOUD Final    RBC, UA 09/21/2021 0-2  0-2/hpf /hpf Final    WBC, UA 09/21/2021 5-9  0-4/hpf /hpf Final    Epithelial Cells, UA 09/21/2021 0-2  3-5/hpf /hpf Final    Bacteria, UA 09/21/2021 NONE SEEN  FEW/NONE SEEN /hpf Final    Casts UA 09/21/2021 4-8 HYALINE  NONE SEEN /lpf Final    Crystals, UA 09/21/2021 NONE SEEN  NONE SEEN Final    Renal Epithelial, UA 09/21/2021 NONE SEEN  NONE SEEN Final    Yeast, UA 09/21/2021 NONE SEEN  NONE SEEN Final    CASTS 2 09/21/2021 NONE SEEN  NONE SEEN /lpf Final    MISCELLANEOUS 2 09/21/2021 NONE SEEN   Final        Diagnosis Orders   1. Ulcer of penis  RPR    HIV Screen    Hepatitis C Antibody    Hepatitis B Surface Antigen   2. High risk sexual behavior, unspecified type  RPR    HIV Screen    Hepatitis C Antibody    Hepatitis B Surface Antigen       Plan  -Penile ulcer: small and nothing really to culture. Check RPR, Hep B and C and HIV. F/u treatment based on results. Safe sex practices reinforced. Health Maintenance Due   Topic Date Due    Hepatitis C screen  Never done    Varicella vaccine (1 of 2 - 2-dose childhood series) Never done    Pneumococcal 0-64 years Vaccine (1 of 2 - PPSV23) Never done    COVID-19 Vaccine (1) Never done    HIV screen  Never done    DTaP/Tdap/Td vaccine (1 - Tdap) Never done    Flu vaccine (1) Never done       Attending Physician Statement  I have discussed the case, including pertinent history and exam findings with the resident. I agree with the documented assessment and plan as documented by the resident.   GE modifier added to this encounter      Jennifer Baugh DO 10/12/2021 2:09 PM

## 2021-10-12 NOTE — PROGRESS NOTES
Health Maintenance Due   Topic Date Due    Hepatitis C screen  Never done    Varicella vaccine (1 of 2 - 2-dose childhood series) Never done    Pneumococcal 0-64 years Vaccine (1 of 2 - PPSV23) Never done    COVID-19 Vaccine (1) Never done    HIV screen  Never done    DTaP/Tdap/Td vaccine (1 - Tdap) Never done    Flu vaccine (1) Never done   discuss ED visit 9/21;Patient c/o burning with urination-but went away after antibiotic, took penicillin, sore on inside of right leg-after starting medication; doxycyline also    Wants to discuss lab results

## 2021-10-12 NOTE — PROGRESS NOTES
Melvin Brian is a 39 y.o. male who presents today for:  Chief Complaint   Patient presents with    Follow-up     ER 9/21/21, rash- discuss medication thinks its a possible SE    Labs Only     9/21/21-    Discuss Medications     takes testosterone booster-     Results     wants lab for syphillis       HPI:   Confused about his recent labs  Got Rx of doxy and ceftriaxone in ED    Burning with urination has stopped, no blood in urine   Endorse sore on penis  3-4 days, not painful, raised, red     States R thigh has a sore from the IM ceftriaxone, noticed 4 days ago  No drainage, not painful  Feels that it is healing     Has had about 10 partners in the last 6 months   Does not use condoms   Interested in further STI testing          Food Insecurity: No Food Insecurity    Worried About Running Out of Food in the Last Year: Never true    Haile of Food in the Last Year: Never true     Health Maintenance reviewed -   Health Maintenance   Topic Date Due    Hepatitis C screen  Never done    Varicella vaccine (1 of 2 - 2-dose childhood series) Never done    Pneumococcal 0-64 years Vaccine (1 of 2 - PPSV23) Never done    COVID-19 Vaccine (1) Never done    HIV screen  Never done    DTaP/Tdap/Td vaccine (1 - Tdap) Never done    Flu vaccine (1) Never done    Hepatitis A vaccine  Aged Out    Hepatitis B vaccine  Aged Out    Hib vaccine  Aged Out    Meningococcal (ACWY) vaccine  Aged Out     No current outpatient medications on file. No current facility-administered medications for this visit. Social History     Tobacco Use    Smoking status: Current Every Day Smoker     Packs/day: 1.00     Years: 20.00     Pack years: 20.00     Types: Cigarettes     Start date: 1999    Smokeless tobacco: Never Used   Substance Use Topics    Alcohol use: Yes     Comment: rarely      Subjective:   Review of Systems   Skin: Positive for rash and wound.        Objective:     Vitals:    10/12/21 1315   BP: 122/78   Site: Left Upper Arm   Position: Sitting   Cuff Size: Large Adult   Pulse: 97   Temp: 97.1 °F (36.2 °C)   TempSrc: Skin   SpO2: 98%   Weight: 219 lb 9.6 oz (99.6 kg)   Height: 5' 7\" (1.702 m)     Body mass index is 34.39 kg/m². Wt Readings from Last 3 Encounters:   10/12/21 219 lb 9.6 oz (99.6 kg)   09/21/21 205 lb (93 kg)   01/06/20 200 lb (90.7 kg)     BP Readings from Last 3 Encounters:   10/12/21 122/78   09/21/21 133/88   01/06/20 128/76     Physical Exam  Vitals and nursing note reviewed. Constitutional:       General: He is not in acute distress. Appearance: He is well-developed. He is not diaphoretic. Cardiovascular:      Rate and Rhythm: Normal rate and regular rhythm. Heart sounds: Normal heart sounds. No murmur heard. Pulmonary:      Effort: Pulmonary effort is normal.      Breath sounds: Normal breath sounds. No wheezing. Abdominal:      General: There is no distension. Palpations: Abdomen is soft. Tenderness: There is no abdominal tenderness. Skin:     Comments: 2 mm round ulcer on the inferior aspect of the penis, nontender to palpation, no surrounding erythema, no drainage   Neurological:      Mental Status: He is alert. Psychiatric:         Thought Content: Thought content normal.         Judgment: Judgment normal.         Assessment / Plan:   Lewis Guardian was seen today for follow-up, labs only, discuss medications and results. Diagnoses and all orders for this visit:    Ulcer of penis  -     RPR; Future  -     HIV Screen; Future  -     Hepatitis C Antibody; Future  -     Hepatitis B Surface Antigen; Future    High risk sexual behavior, unspecified type  -     RPR; Future  -     HIV Screen; Future  -     Hepatitis C Antibody; Future  -     Hepatitis B Surface Antigen; Future      Patient presents to discuss recent ER visit   Also routine and/high risk sexual behavior-patient recently seen in the ED for dysuria, urine was bland and GC chlamydia were negative.   Patient did receive treatment for GC chlamydia with IM Rocephin and 7 days of doxycycline. Given patient's ulcer, we have high concern for syphilis. Will check RPR, HIV screen, hepatitis C, hepatitis B. Further treatment pending lab results    Patient to follow-up in 3 months, earlier as needed. Will be discussing results over phone       Return in about 3 months (around 1/12/2022). Medications Prescribed:  No orders of the defined types were placed in this encounter. No future appointments. Patient given educational materials - see patient instructions. Discussed use, benefit, and side effects of prescribed medications. All patient questions answered. Pt voiced understanding. Instructed to continue current medications, diet and exercise. Patient agreed with treatment plan. Follow up as directed. Part of this visit was documented via [id number] technology, please excuse any errors.      Electronically signed by Agustina Christianson MD on 10/12/2021 at 2:03 PM

## 2021-10-13 ENCOUNTER — TELEPHONE (OUTPATIENT)
Dept: FAMILY MEDICINE CLINIC | Age: 36
End: 2021-10-13

## 2021-10-13 DIAGNOSIS — N48.5 ULCER OF PENIS: Primary | ICD-10-CM

## 2021-10-13 LAB
HIV AG/AB: NONREACTIVE
RPR: NONREACTIVE

## 2021-10-13 NOTE — TELEPHONE ENCOUNTER
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 Please Advise.

## 2021-10-13 NOTE — TELEPHONE ENCOUNTER
Syphilis testing was negative  Would recommend local wound care to the lesion  If lesion does not resolve within 2 weeks, patient should be seen for further evaluation

## 2021-10-14 ENCOUNTER — TELEPHONE (OUTPATIENT)
Dept: FAMILY MEDICINE CLINIC | Age: 36
End: 2021-10-14

## 2022-04-19 ENCOUNTER — OFFICE VISIT (OUTPATIENT)
Dept: FAMILY MEDICINE CLINIC | Age: 37
End: 2022-04-19
Payer: COMMERCIAL

## 2022-04-19 VITALS
SYSTOLIC BLOOD PRESSURE: 118 MMHG | TEMPERATURE: 97.9 F | HEIGHT: 67 IN | RESPIRATION RATE: 16 BRPM | HEART RATE: 80 BPM | DIASTOLIC BLOOD PRESSURE: 76 MMHG | OXYGEN SATURATION: 98 % | WEIGHT: 218.4 LBS | BODY MASS INDEX: 34.28 KG/M2

## 2022-04-19 DIAGNOSIS — R55 SYNCOPE AND COLLAPSE: Primary | ICD-10-CM

## 2022-04-19 DIAGNOSIS — R00.2 PALPITATIONS: ICD-10-CM

## 2022-04-19 PROCEDURE — 4004F PT TOBACCO SCREEN RCVD TLK: CPT | Performed by: STUDENT IN AN ORGANIZED HEALTH CARE EDUCATION/TRAINING PROGRAM

## 2022-04-19 PROCEDURE — G8427 DOCREV CUR MEDS BY ELIG CLIN: HCPCS | Performed by: STUDENT IN AN ORGANIZED HEALTH CARE EDUCATION/TRAINING PROGRAM

## 2022-04-19 PROCEDURE — 93000 ELECTROCARDIOGRAM COMPLETE: CPT | Performed by: STUDENT IN AN ORGANIZED HEALTH CARE EDUCATION/TRAINING PROGRAM

## 2022-04-19 PROCEDURE — 99213 OFFICE O/P EST LOW 20 MIN: CPT | Performed by: STUDENT IN AN ORGANIZED HEALTH CARE EDUCATION/TRAINING PROGRAM

## 2022-04-19 PROCEDURE — G8417 CALC BMI ABV UP PARAM F/U: HCPCS | Performed by: STUDENT IN AN ORGANIZED HEALTH CARE EDUCATION/TRAINING PROGRAM

## 2022-04-19 ASSESSMENT — ENCOUNTER SYMPTOMS
WHEEZING: 0
DIARRHEA: 0
ABDOMINAL PAIN: 0
SHORTNESS OF BREATH: 0
CONSTIPATION: 0

## 2022-04-19 NOTE — PROGRESS NOTES
Health Maintenance Due   Topic Date Due    Varicella vaccine (1 of 2 - 2-dose childhood series) Never done    COVID-19 Vaccine (1) Never done    Pneumococcal 0-64 years Vaccine (1 - PCV) Never done    DTaP/Tdap/Td vaccine (1 - Tdap) Never done    Diabetes screen  Never done

## 2022-04-19 NOTE — PATIENT INSTRUCTIONS
Thank you   1. Thank you for trusting us with your healthcare needs. You may receive a survey regarding today's visit. It would help us out if you would take a few moments to provide your feedback. We value your input. 2. Please bring in ALL medications BOTTLES, including inhalers, herbal supplements, over the counter, prescribed & non-prescribed medicine. The office would like actual medication bottles and a list.   3. Please note our OFFICE POLICIES:   a. Prior to getting your labs drawn, please check with your insurance company for benefits and eligibility of lab services. Often, insurance companies cover certain tests for preventative visits only. It is patient's responsibility to see what is covered. b. We are unable to change a diagnosis after the test has been performed. c. Lab orders will not be re-printed. Please hold onto your original lab orders and take them to your lab to be completed. d. If you no show your scheduled appointment three times, you will be dismissed from this practice. e. Thompson Miyamoto must be completed 24 hours prior to your schedule appointment. 4. If the list below has been completed, PLEASE FAX RECORDS TO OUR OFFICE @ [phone].  Once the records have been received we will update your records at our office:  Health Maintenance Due   Topic Date Due    Varicella vaccine (1 of 2 - 2-dose childhood series) Never done    COVID-19 Vaccine (1) Never done    Pneumococcal 0-64 years Vaccine (1 - PCV) Never done    DTaP/Tdap/Td vaccine (1 - Tdap) Never done    Diabetes screen  Never done           Tobacco Cessation Programs     Telephonic behavior modification   1-800-QUIT-NOW (766-4590)   Counseling service for those who are ready to quit using tobacco.     Available for uninsured [place] residents, [place] recipients, pregnant women, or patients whose health plans or employers are members of the [address] behavior modification   http://[place]. Quitlogix. org   Online support program to help patients through each step of the quitting process. Available 24 hours a day 7 days a week. Provides up to date researched based tool, step-by-step guides, and motivational messages. Online behavior modification   www.lungusa.org/stop-smoking/how-to-quit   HelpLine: [facility] (839-7992)   Email questions to:  Alexus@[employer]. Zollo    Website offers resources to help tobacco users figure out their reasons for quitting and then take the big step of quitting for good. Hypnosis   Location: [address] [facility], [employer], [address]   Contact: Isha Taylor, PhD at [phone]   Hypnosis for tobacco cessation   Cost $225 for the initial session and $175 for each session afterwards. Most patients require 6-8 sessions. There is the option to submit through the patients insurance. Hypnosis and behavior modification   Location: [place] [address],  [address] [employer] [place] [email or web address], [address]   Contact: Mattie Whitmore, PhD at [phone]  [place] [facility] and hypnosis for nicotine addition   Cost: For uninsured patients:  Please call above phone number  Cost for insured patients depends on patients insurance plan. Behavior modification   Location: [facility], [address] [facility] [address] [employer] [place] [email or web address], [address]   Contact: Leia Stubbs include four one-on-one appointments between the patient and a respiratory therapist.  The four appointments span over three weeks. The respiratory therapist schedules one of the appointments to occur 48 hours after the patients quit date.  Cost $100 total for the four sessions.   Tobacco cessation products are not included in the cost and are not provided by [facility].

## 2022-04-19 NOTE — PROGRESS NOTES
Pepito Lubin is a 39 y.o. male who presents today for:  Chief Complaint   Patient presents with    Loss of Consciousness     twice once in Sept after getting a shot in his thigh and then again about 1-2 weeks ago after needling at chiropractor visit       HPI:   States he has passed out twice in the last 7 months  In the ED last September, got IM shot of antibiotic  Got lightheaded and passed out in the ED, no other prodromal symptoms   Not sure how long he was out  Woke up a little confused, but back to normal quickly   No anxiety symptoms     Second time was a few weeks ago  See chiropractor for L shoulder, doing dry needling  Went to stand up and pay bill  Passed out about a minute after rising, was told he was out for a minute  Same feeling of lightheadedness prior to passing out    In between them, has had blood draws without problem    Has had very short lived palpitations 1-3 times over the past several months  <10 seconds each     Denies dizziness with position changes   No trouble exercising     States when he was 12 he pass out and had a seizure   Not quite sure about it     Smokes 1ppd   Smokes thc once daily        Food Insecurity: No Food Insecurity    Worried About Running Out of Food in the Last Year: Never true    Haile of Food in the Last Year: Never true     Health Maintenance reviewed -   Health Maintenance   Topic Date Due    Varicella vaccine (1 of 2 - 2-dose childhood series) Never done    COVID-19 Vaccine (1) Never done    Pneumococcal 0-64 years Vaccine (1 - PCV) Never done    DTaP/Tdap/Td vaccine (1 - Tdap) Never done    Diabetes screen  Never done    Flu vaccine (Season Ended) 09/01/2022    Depression Screen  10/12/2022    Hepatitis C screen  Completed    HIV screen  Completed    Hepatitis A vaccine  Aged Out    Hepatitis B vaccine  Aged Out    Hib vaccine  Aged Out    Meningococcal (ACWY) vaccine  Aged Out     No current outpatient medications on file.      No current facility-administered medications for this visit. Social History     Tobacco Use    Smoking status: Current Every Day Smoker     Packs/day: 1.00     Years: 20.00     Pack years: 20.00     Types: Cigarettes     Start date: 1999    Smokeless tobacco: Never Used   Substance Use Topics    Alcohol use: Yes     Comment: rarely      Subjective:   Review of Systems   Constitutional: Negative for fatigue. Respiratory: Negative for shortness of breath and wheezing. Cardiovascular: Positive for palpitations. Negative for chest pain and leg swelling. Gastrointestinal: Negative for abdominal pain, constipation and diarrhea. Neurological: Positive for syncope. Negative for weakness. Psychiatric/Behavioral: Negative for sleep disturbance. Objective:     Vitals:    04/19/22 1418 04/19/22 1511 04/19/22 1512 04/19/22 1513   BP: 118/82 118/70 118/74 118/76   Site: Right Upper Arm Left Upper Arm Left Upper Arm Left Upper Arm   Position: Sitting Supine Sitting Standing   Cuff Size: Large Adult Large Adult Large Adult Large Adult   Pulse: 90 72 75 80   Resp: 16      Temp: 97.9 °F (36.6 °C)      TempSrc: Temporal      SpO2: 98%      Weight: 218 lb 6.4 oz (99.1 kg)      Height: 5' 7\" (1.702 m)        Body mass index is 34.21 kg/m². Wt Readings from Last 3 Encounters:   04/19/22 218 lb 6.4 oz (99.1 kg)   10/12/21 219 lb 9.6 oz (99.6 kg)   09/21/21 205 lb (93 kg)     BP Readings from Last 3 Encounters:   04/19/22 118/76   10/12/21 122/78   09/21/21 133/88     Physical Exam  Vitals and nursing note reviewed. Constitutional:       General: He is not in acute distress. Appearance: He is well-developed. He is not diaphoretic. Cardiovascular:      Rate and Rhythm: Normal rate and regular rhythm. Heart sounds: Normal heart sounds. No murmur heard. Pulmonary:      Effort: Pulmonary effort is normal.      Breath sounds: Normal breath sounds. No wheezing. Abdominal:      General: There is no distension. Palpations: Abdomen is soft. Tenderness: There is no abdominal tenderness. Skin:     Findings: No lesion or rash. Neurological:      Mental Status: He is alert. Psychiatric:         Thought Content: Thought content normal.         Judgment: Judgment normal.       Orthostatic vitals here with WNL  EKG here was WNL    Assessment / Plan:   Sharon Arce was seen today for loss of consciousness. Diagnoses and all orders for this visit:    Syncope and collapse  -     EKG 12 Lead  -     Comprehensive Metabolic Panel; Future  -     CBC with Auto Differential; Future  -     TSH; Future  -     T4, Free; Future    Palpitations  -     Comprehensive Metabolic Panel; Future  -     CBC with Auto Differential; Future  -     TSH; Future  -     T4, Free; Future      Patient presents to discuss his symptoms   Syncope and collapse/palpitations- new problems. Orthostatics and ekg here were both reassuring. Will check some basic labs listed above. Discussed that holter monitor probably would not catch any abnormalities. Did not feel ECHO was warranted at this time. Felt symptoms likely related to circumstances. Discussed if he continues to have symptoms, return to clinic for further evaluation. Patient to follow-up in 6 months, earlier PRN     Return in about 6 months (around 10/19/2022). Medications Prescribed:  No orders of the defined types were placed in this encounter. No future appointments. Patient given educational materials - see patient instructions. Discussed use, benefit, and side effects of prescribed medications. All patient questions answered. Pt voiced understanding. Instructed to continue current medications, diet and exercise. Patient agreed with treatment plan. Follow up as directed. Part of this visit was documented via [id number] technology, please excuse any errors.      Electronically signed by Anibal Berman MD on 4/19/2022 at 3:29 PM

## 2022-04-19 NOTE — PROGRESS NOTES
S: 39 y.o. male with   Chief Complaint   Patient presents with    Loss of Consciousness     twice once in Sept after getting a shot in his thigh and then again about 1-2 weeks ago after needling at chiropractor visit       HPI: please see resident note for HPI and ROS. BP Readings from Last 3 Encounters:   04/19/22 118/76   10/12/21 122/78   09/21/21 133/88     Wt Readings from Last 3 Encounters:   04/19/22 218 lb 6.4 oz (99.1 kg)   10/12/21 219 lb 9.6 oz (99.6 kg)   09/21/21 205 lb (93 kg)       O: VS:  height is 5' 7\" (1.702 m) and weight is 218 lb 6.4 oz (99.1 kg). His temporal temperature is 97.9 °F (36.6 °C). His blood pressure is 118/76 and his pulse is 80. His respiration is 16 and oxygen saturation is 98%. Diagnosis Orders   1. Syncope and collapse  EKG 12 Lead   2. Palpitations         Plan:  ekg today. Labs as ordered. Health Maintenance Due   Topic Date Due    Varicella vaccine (1 of 2 - 2-dose childhood series) Never done    COVID-19 Vaccine (1) Never done    Pneumococcal 0-64 years Vaccine (1 - PCV) Never done    DTaP/Tdap/Td vaccine (1 - Tdap) Never done    Diabetes screen  Never done       Attending Physician Statement  I have discussed the case, including pertinent history and exam findings with the resident. I agree with the documented assessment and plan as documented by the resident.         Gil Dhillon DO 4/19/2022 3:12 PM

## 2022-04-26 ENCOUNTER — NURSE ONLY (OUTPATIENT)
Dept: LAB | Age: 37
End: 2022-04-26

## 2022-04-26 DIAGNOSIS — N48.5 ULCER OF PENIS: ICD-10-CM

## 2022-04-26 DIAGNOSIS — R00.2 PALPITATIONS: ICD-10-CM

## 2022-04-26 DIAGNOSIS — R55 SYNCOPE AND COLLAPSE: ICD-10-CM

## 2022-04-26 LAB
ALBUMIN SERPL-MCNC: 4.3 G/DL (ref 3.5–5.1)
ALP BLD-CCNC: 80 U/L (ref 38–126)
ALT SERPL-CCNC: 19 U/L (ref 11–66)
ANION GAP SERPL CALCULATED.3IONS-SCNC: 14 MEQ/L (ref 8–16)
AST SERPL-CCNC: 20 U/L (ref 5–40)
BASOPHILS # BLD: 0.4 %
BASOPHILS ABSOLUTE: 0 THOU/MM3 (ref 0–0.1)
BILIRUB SERPL-MCNC: 0.2 MG/DL (ref 0.3–1.2)
BUN BLDV-MCNC: 12 MG/DL (ref 7–22)
CALCIUM SERPL-MCNC: 9 MG/DL (ref 8.5–10.5)
CHLORIDE BLD-SCNC: 101 MEQ/L (ref 98–111)
CO2: 23 MEQ/L (ref 23–33)
CREAT SERPL-MCNC: 0.9 MG/DL (ref 0.4–1.2)
EOSINOPHIL # BLD: 0.2 %
EOSINOPHILS ABSOLUTE: 0 THOU/MM3 (ref 0–0.4)
ERYTHROCYTE [DISTWIDTH] IN BLOOD BY AUTOMATED COUNT: 12.6 % (ref 11.5–14.5)
ERYTHROCYTE [DISTWIDTH] IN BLOOD BY AUTOMATED COUNT: 45.2 FL (ref 35–45)
GFR SERPL CREATININE-BSD FRML MDRD: > 90 ML/MIN/1.73M2
GLUCOSE BLD-MCNC: 100 MG/DL (ref 70–108)
HCT VFR BLD CALC: 46.1 % (ref 42–52)
HEMOGLOBIN: 15.3 GM/DL (ref 14–18)
IMMATURE GRANS (ABS): 0.02 THOU/MM3 (ref 0–0.07)
IMMATURE GRANULOCYTES: 0.2 %
LYMPHOCYTES # BLD: 20.6 %
LYMPHOCYTES ABSOLUTE: 2.1 THOU/MM3 (ref 1–4.8)
MCH RBC QN AUTO: 32 PG (ref 26–33)
MCHC RBC AUTO-ENTMCNC: 33.2 GM/DL (ref 32.2–35.5)
MCV RBC AUTO: 96.4 FL (ref 80–94)
MONOCYTES # BLD: 5.3 %
MONOCYTES ABSOLUTE: 0.5 THOU/MM3 (ref 0.4–1.3)
NUCLEATED RED BLOOD CELLS: 0 /100 WBC
PLATELET # BLD: 222 THOU/MM3 (ref 130–400)
PMV BLD AUTO: 9.7 FL (ref 9.4–12.4)
POTASSIUM SERPL-SCNC: 4.5 MEQ/L (ref 3.5–5.2)
RBC # BLD: 4.78 MILL/MM3 (ref 4.7–6.1)
SEG NEUTROPHILS: 73.3 %
SEGMENTED NEUTROPHILS ABSOLUTE COUNT: 7.5 THOU/MM3 (ref 1.8–7.7)
SODIUM BLD-SCNC: 138 MEQ/L (ref 135–145)
T4 FREE: 1.25 NG/DL (ref 0.93–1.76)
TOTAL PROTEIN: 6.4 G/DL (ref 6.1–8)
TSH SERPL DL<=0.05 MIU/L-ACNC: 1.31 UIU/ML (ref 0.4–4.2)
WBC # BLD: 10.2 THOU/MM3 (ref 4.8–10.8)

## 2022-04-27 ENCOUNTER — TELEPHONE (OUTPATIENT)
Dept: FAMILY MEDICINE CLINIC | Age: 37
End: 2022-04-27

## 2022-04-27 LAB — RPR: NONREACTIVE

## 2022-04-27 NOTE — TELEPHONE ENCOUNTER
----- Message from Carlo Cardenas MD sent at 4/27/2022  9:12 AM EDT -----  Normal electrolytes, kidneys, liver, thyroid, blood counts   Everything looks good

## 2022-05-11 ENCOUNTER — OFFICE VISIT (OUTPATIENT)
Dept: FAMILY MEDICINE CLINIC | Age: 37
End: 2022-05-11
Payer: COMMERCIAL

## 2022-05-11 VITALS
BODY MASS INDEX: 33.12 KG/M2 | HEART RATE: 92 BPM | OXYGEN SATURATION: 97 % | RESPIRATION RATE: 20 BRPM | SYSTOLIC BLOOD PRESSURE: 110 MMHG | WEIGHT: 211 LBS | DIASTOLIC BLOOD PRESSURE: 78 MMHG | HEIGHT: 67 IN | TEMPERATURE: 96.6 F

## 2022-05-11 DIAGNOSIS — G89.29 CHRONIC LEFT SHOULDER PAIN: Primary | ICD-10-CM

## 2022-05-11 DIAGNOSIS — B35.1 ONYCHOMYCOSIS: ICD-10-CM

## 2022-05-11 DIAGNOSIS — M25.512 CHRONIC LEFT SHOULDER PAIN: Primary | ICD-10-CM

## 2022-05-11 PROCEDURE — G8427 DOCREV CUR MEDS BY ELIG CLIN: HCPCS | Performed by: STUDENT IN AN ORGANIZED HEALTH CARE EDUCATION/TRAINING PROGRAM

## 2022-05-11 PROCEDURE — 99214 OFFICE O/P EST MOD 30 MIN: CPT | Performed by: STUDENT IN AN ORGANIZED HEALTH CARE EDUCATION/TRAINING PROGRAM

## 2022-05-11 PROCEDURE — 4004F PT TOBACCO SCREEN RCVD TLK: CPT | Performed by: STUDENT IN AN ORGANIZED HEALTH CARE EDUCATION/TRAINING PROGRAM

## 2022-05-11 PROCEDURE — G8417 CALC BMI ABV UP PARAM F/U: HCPCS | Performed by: STUDENT IN AN ORGANIZED HEALTH CARE EDUCATION/TRAINING PROGRAM

## 2022-05-11 RX ORDER — TERBINAFINE HYDROCHLORIDE 250 MG/1
250 TABLET ORAL DAILY
Qty: 42 TABLET | Refills: 0 | Status: SHIPPED | OUTPATIENT
Start: 2022-05-11 | End: 2022-06-22

## 2022-05-11 ASSESSMENT — ENCOUNTER SYMPTOMS
VOMITING: 0
COUGH: 0
NAUSEA: 0
COLOR CHANGE: 1
SHORTNESS OF BREATH: 0

## 2022-05-11 NOTE — PATIENT INSTRUCTIONS
Patient Education        The Shoulder Joint: Anatomy Sketch     Current as of: July 1, 2021               Content Version: 13.2  © 2006-2022 Healthwise, Incorporated. Care instructions adapted under license by [facility] [place] ([facility]). If you have questions about a medical condition or this instruction, always ask your healthcare professional. [place] [address] any warranty or liability for your use of this information.

## 2022-05-11 NOTE — PROGRESS NOTES
[address] [facility] [place] [address] [address] [address]  Dept: [phone]  Dept Fax: [phone]  Loc: [phone]    Bob Johnson is a 39 y.o. male who presents today for:  Chief Complaint   Patient presents with    Shoulder Pain     left    Other     discoloration of left index finger       HPI:   Patient presents today with complaints of:    Left shoulder pain: Intermittent left shoulder pain for the past 4 years, usually when he is at the gym. No trauma he can recall, does hold his children with that arm and shoulder. When he does squats at the gym, he has some pins and needle feeling from wrist to his shoulder. No alleviating symptoms he can recall but does go to chiro, which seems to help. Yellowing of left pointer finger: Has been on/off for the past 2 years, thinks he passed it on to his girlfriend. Past Medical History:   Diagnosis Date    Diverticular disease 2008    Diverticulitis       Past Surgical History:   Procedure Laterality Date    CYST REMOVAL      neck    OTHER SURGICAL HISTORY  6/11/14    Robot Assisted Sigmoid Colectomy, Cystoscopy and Ureteral cath placement - Dr. Duana Fothergill  6-11-14    Dr. Jeanne Shaver      Family History   Problem Relation Age of Onset    No Known Problems Mother     Other Father     No Known Problems Sister      Social History     Tobacco Use    Smoking status: Current Every Day Smoker     Packs/day: 1.00     Years: 20.00     Pack years: 20.00     Types: Cigarettes     Start date: 1999    Smokeless tobacco: Never Used   Substance Use Topics    Alcohol use: Yes     Comment: rarely      Current Outpatient Medications   Medication Sig Dispense Refill    terbinafine (LAMISIL) 250 MG tablet Take 1 tablet by mouth daily 42 tablet 0     No current facility-administered medications for this visit.      No Known Allergies    Health Maintenance   Topic Date Due    Varicella vaccine (1 of 2 - 2-dose childhood series) Never done    COVID-19 Vaccine (1) Never done    Pneumococcal 0-64 years Vaccine (1 - PCV) Never done    DTaP/Tdap/Td vaccine (1 - Tdap) Never done    Flu vaccine (Season Ended) 09/01/2022    Depression Screen  10/12/2022    Hepatitis C screen  Completed    HIV screen  Completed    Hepatitis A vaccine  Aged Out    Hepatitis B vaccine  Aged Out    Hib vaccine  Aged Out    Meningococcal (ACWY) vaccine  Aged Out       Subjective:      Review of Systems   Constitutional: Positive for activity change. Negative for chills and fever. Respiratory: Negative for cough and shortness of breath. Gastrointestinal: Negative for nausea and vomiting. Musculoskeletal: Negative for joint swelling. Skin: Positive for color change. Negative for rash. Neurological: Negative for weakness and numbness. Psychiatric/Behavioral: Negative for decreased concentration and dysphoric mood. Objective:     Vitals:    05/11/22 1308   BP: 110/78   Site: Left Upper Arm   Position: Sitting   Cuff Size: Medium Adult   Pulse: 92   Resp: 20   Temp: 96.6 °F (35.9 °C)   TempSrc: Skin   SpO2: 97%   Weight: 211 lb (95.7 kg)   Height: 5' 7\" (1.702 m)       Body mass index is 33.05 kg/m². Wt Readings from Last 3 Encounters:   05/11/22 211 lb (95.7 kg)   04/19/22 218 lb 6.4 oz (99.1 kg)   10/12/21 219 lb 9.6 oz (99.6 kg)     BP Readings from Last 3 Encounters:   05/11/22 110/78   04/19/22 118/76   10/12/21 122/78       Physical Exam  Vitals reviewed. Constitutional:       Appearance: He is obese. HENT:      Head: Normocephalic and atraumatic. Right Ear: External ear normal.      Left Ear: External ear normal.   Eyes:      Conjunctiva/sclera: Conjunctivae normal.   Cardiovascular:      Rate and Rhythm: Normal rate. Pulses: Normal pulses.    Pulmonary:      Effort: Pulmonary effort is normal.   Musculoskeletal:      Right shoulder: Normal.      Left shoulder: Normal. No tenderness, bony tenderness or crepitus. Normal range of motion. Normal strength. Normal pulse. Skin:     General: Skin is warm. Nails: There is no clubbing. Comments: Left pointer fingernail with yellow discoloration consistent with fungal changes   Neurological:      Mental Status: He is alert and oriented to person, place, and time. Psychiatric:         Mood and Affect: Mood normal.         Behavior: Behavior normal.         Lab Results   Component Value Date    WBC 10.2 04/26/2022    HGB 15.3 04/26/2022    HCT 46.1 04/26/2022     04/26/2022    AST 20 04/26/2022     04/26/2022    K 4.5 04/26/2022     04/26/2022    CREATININE 0.9 04/26/2022    BUN 12 04/26/2022    CO2 23 04/26/2022    TSH 1.310 04/26/2022    LABGLOM >90 04/26/2022    MG 1.7 05/01/2014    CALCIUM 9.0 04/26/2022       Imaging Results:    No results found. Assessment / Plan:     Mady Stone was seen today for shoulder pain and other. Diagnoses and all orders for this visit:    Chronic left shoulder pain:  - Likely overuse injury. ?labral tear; however, special testing did not show any overt signs of possible labral tear. Discussed PT as initial step. Patient agreeable to start PT. Will get Xray to rule out any degenerative changes, although unlikely given patient's age. -     XR SHOULDER LEFT (MIN 2 VIEWS); Future  -     [facility] - [facility]    Onychomycosis:  - Recent CMP with LFTs wnl  - Will do Lamisil for 6 weeks. -     terbinafine (LAMISIL) 250 MG tablet; Take 1 tablet by mouth daily     Return in about 4 weeks (around 6/8/2022).       Medications Prescribed:  Orders Placed This Encounter   Medications    terbinafine (LAMISIL) 250 MG tablet     Sig: Take 1 tablet by mouth daily     Dispense:  42 tablet     Refill:  0       Future Appointments   Date Time Provider Rafaela Guevara   5/17/2022  1:45 PM PRACHI Fitzgerald OT       Patient given educational materials - see patient instructions. Discussed use, benefit, and sideeffects of prescribed medications. All patient questions answered. Pt voiced understanding. Reviewed health maintenance. Instructed to continue current medications, diet and exercise. Patient agreed with treatment plan. Follow up as directed.      Electronically signed by Bridger Foster MD on 5/11/2022 at 5:32 PM

## 2022-05-11 NOTE — PROGRESS NOTES
S: 39 y.o. male with   Chief Complaint   Patient presents with    Shoulder Pain     left    Other     discoloration of left index finger       HPI: please see resident note for HPI and ROS. Left shoulder pain for 4 years  Comes and goes  Weightlifter  Chiropractor helping  No history of trauma or baseball  No weakness    yellow nail. other fingers are fine been like that for 2 years, no pain  BP Readings from Last 3 Encounters:   05/11/22 110/78   04/19/22 118/76   10/12/21 122/78     Wt Readings from Last 3 Encounters:   05/11/22 211 lb (95.7 kg)   04/19/22 218 lb 6.4 oz (99.1 kg)   10/12/21 219 lb 9.6 oz (99.6 kg)       O: VS:  height is 5' 7\" (1.702 m) and weight is 211 lb (95.7 kg). His skin temperature is 96.6 °F (35.9 °C). His blood pressure is 110/78 and his pulse is 92. His respiration is 20 and oxygen saturation is 97%. Diagnosis Orders   1. Chronic left shoulder pain  XR SHOULDER LEFT (MIN 2 VIEWS)    [facility]   2.  Onychomycosis  terbinafine (LAMISIL) 250 MG tablet       Plan:  Recommend PT  Continue conservative management  Home exercises  XR of the Shoulder  Start Lamisil  F/u in 4 weeks     Health Maintenance Due   Topic Date Due    Varicella vaccine (1 of 2 - 2-dose childhood series) Never done    COVID-19 Vaccine (1) Never done    Pneumococcal 0-64 years Vaccine (1 - PCV) Never done    DTaP/Tdap/Td vaccine (1 - Tdap) Never done         Xiao Umana MD 5/11/2022 2:02 PM

## 2022-05-11 NOTE — PROGRESS NOTES
S: 39 y.o. male with   Chief Complaint   Patient presents with    Shoulder Pain     left    Other     discoloration of left index finger       -L Shoulder Pain    HPI:    On and off x 4 wks  Comes and goes  Worse when using shoulder or lifting weights  Has been seeing chiro, helps some  No specific injury  No weakness    Inciting injury or history of trauma? No  Pain is relieved by - rest  Pain is aggravated by - as above  Radiation of the pain? No  Paresthesias of the extremities? No  Decreased ROM? No  Treatments tried - as above      -L index finger problem:  Yellowing of the nail for 2 years  No pain  No redness. BP Readings from Last 3 Encounters:   05/11/22 110/78   04/19/22 118/76   10/12/21 122/78     Wt Readings from Last 3 Encounters:   05/11/22 211 lb (95.7 kg)   04/19/22 218 lb 6.4 oz (99.1 kg)   10/12/21 219 lb 9.6 oz (99.6 kg)       O: VS:  height is 5' 7\" (1.702 m) and weight is 211 lb (95.7 kg). His skin temperature is 96.6 °F (35.9 °C). His blood pressure is 110/78 and his pulse is 92. His respiration is 20 and oxygen saturation is 97%. Lab Results   Component Value Date    ALKPHOS 80 04/26/2022    ALT 19 04/26/2022    AST 20 04/26/2022    PROT 6.4 04/26/2022    BILITOT 0.2 04/26/2022    LABALBU 4.3 04/26/2022        Diagnosis Orders   1. Chronic left shoulder pain  XR SHOULDER LEFT (MIN 2 VIEWS)    [facility]   2. Onychomycosis  terbinafine (LAMISIL) 250 MG tablet       Plan  -Chronic L shoulder pain: will start with PT/OT, will get xray. If no better after therapy, f/u, and will move fwd with MRI.   -Onychomycosis L index finger: Lamisil daily x 6 wks.        Health Maintenance Due   Topic Date Due    Varicella vaccine (1 of 2 - 2-dose childhood series) Never done    COVID-19 Vaccine (1) Never done    Pneumococcal 0-64 years Vaccine (1 - PCV) Never done    DTaP/Tdap/Td vaccine (1 - Tdap) Never done         Attending Physician Statement  I have discussed the case, including pertinent history and exam findings with the resident. I agree with the documented assessment and plan as documented by the resident.   GE modifier added to this encounter      Natan Kelley DO 5/11/2022 2:06 PM

## 2022-05-17 ENCOUNTER — HOSPITAL ENCOUNTER (OUTPATIENT)
Dept: OCCUPATIONAL THERAPY | Age: 37
Setting detail: THERAPIES SERIES
Discharge: HOME | End: 2022-05-17
Payer: COMMERCIAL

## 2022-05-17 PROCEDURE — 97165 OT EVAL LOW COMPLEX 30 MIN: CPT

## 2022-05-17 PROCEDURE — 97110 THERAPEUTIC EXERCISES: CPT

## 2022-05-17 NOTE — PROGRESS NOTES
** PLEASE SIGN, DATE AND TIME CERTIFICATION BELOW AND RETURN TO [facility] OUTPATIENT REHABILITATION (FAX #: [phone]). ATTEST/CO-SIGN IF ACCESSING VIA INZEB. THANK YOU.**    I certify that I have examined the patient below and determined that Physical Medicine and Rehabilitation service is necessary and that I approve the established plan of care for up to 90 days or as specifically noted. Attestation, signature or co-signature of physician indicates approval of certification requirements.    ________________________ ____________ __________  Physician Signature   Date   Time   [address]  [x] EVALUATION  [] DAILY NOTE (LAND) [] DAILY NOTE (AQUATIC ) [] PROGRESS NOTE [] DISCHARGE NOTE    [x] [facility]   [] [address]    [] [address] [facility]   [] Merlinda Box    Date: 2022  Patient Name:  Victor Hugo Worrell  : 1985  MRN: [id number]  CSN: [id number]    Referring Practitioner Darnell Brewster MD   Diagnosis Pain in left shoulder [M25.512]    Treatment Diagnosis Pain left shoulder   Date of Evaluation 22      Functional Outcome Measure Used UEFS   Functional Outcome Score 68/80 (22)       Insurance: Primary: Payor: [address] [place] [address] [address] /  /  / ,   Secondary:    Authorization Information: Medicaid, no precert needed, allow 30 visits of OT per year   Visit # 1, 1/10 for progress note   Visits Allowed: 30   Recertification Date: 2022   Physician Follow-Up: Pt. To call for appointment if therapy does not help   Physician Orders: OT eval and treat   Pertinent History: Pt. Reports a 4 year history of left shoulder pain. States he had a baby daughter and was holding her frequently in left arm. States he has seen a chiropractor for past 2 months for this and the chiropractor uses a TENS unit- states this helps a little bit Pt. States he does lift weights.  Doctor has ordered an xray however pt. Has not gotten it done yet. SUBJECTIVE: Pt. Reports 2/10 pain in left shoulder at rest - describes this as throbbing. Points to posterior shoulder. Social/Functional History:  Electronic Medical Record reviewed and up to date    Nubia Aguirre lives with family - has 2 toddler children at home he picks up  Task Prior Level of Function  (current level of function addressed below)   ADLs  Independent   Ambulation Independent   Transfers Independent   Hobbies Weight lifting, remodeling his house, yardwork   Driving Active    Work [employer] Occupation: works at [employer]:  Hand Dominance right handed   Palpation Tightness in left upper trap   Observation Left scapula sits slightly higher on left, upper trap appears higher on left   Posture Pt. Is a weight  and so has well developed pecs and biceps which are pulling shoulders slightly foreward   Edema    Special Tests        ADL's Pt. Reports discomfort in left shoulder with pushing self up from floor or up out of bed, discomfort lifting his kids, discomfort for yard work, has not been able to do his workout routine like he usually does   Bed Mobility     Transfers    Balance        Sensation Left Upper Extremity: Intact.  - reports numbness/tingling in LUE if doing a squat and holding weight on back of upper shoulders   Coordination WFL           LEFT UPPER EXTREMITY  RANGE OF MOTION    AROM PROM COMMENTS         Shoulder Flexion 155     Shoulder Extension 55     Shoulder Abduction 158     Shoulder Adduction      Shoulder External Rotation 90     Shoulder Internal Rotation Able to get thumb tip up to mid scap level     Shoulder Range of Motion is [place]  []      Elbow Flexion      Elbow Extension      Forearm Pronation      Forearm Supination      Elbow Range of Motion is [place]  [x]      Wrist Flexion      Wrist Extension      Wrist Radial Deviation      Wrist Ulnar Deviation      Wrist Range of Motion is [place]  [x]   If no measurement is recorded, no formal assessment was completed for that motion. LEFT UPPER EXTREMITY  STRENGTH    Strength Rating Comments   Shoulder Flexion 5/5    Shoulder Extension 5/5    Shoulder Abduction 5/5    Shoulder Adduction 5/5    Shoulder External Rotation 4+/5    Shoulder Internal Rotation 5/5    []  Shoulder Strength is grossly WFL. Elbow Flexion     Elbow Extension     Forearm Pronation     Forearm Supination     [x] Elbow Strength is grossly WFL. Wrist Flexion     Wrist Extension     Wrist Radial Deviation     Wrist Ulnar Deviation     [x]  Wrist Strength is grossly WFL. Right Left    Strength Setting:      Pinch Strength Tip Pinch:      Lateral Pinch           If no ratings are recorded, no formal assessment was completed. TREATMENT   Precautions: general precautions Pt.  States they tried dry needling at chiropractor and he had a vasovagal response   Pain: 2/10 pain in posterior left shoulder     X in shaded column indicates Activity Completed Today   Modalities Parameters/  Location  Notes/Comments                     Manual Therapy Time/  Technique  Notes/Comments                     Exercises   Sets/  Sec Reps  Notes/Comments   sidelying sleeper stretch 15 sec  x    Cross body posterior capsule stretch 15 sec  x    Corner stretch 15 sec  x    Upper trap stretch 15 sec  x                         Activities Time    Notes/Comments   Applied kinesiotape to left shoulder in Y strip for upper trap inhibition and 2 I strips anterior to posterior for mechanical correction  x                  Specific Interventions Next Treatment: trial continuous ultrasound to posterior shoulder, posterior capsule and upper trap stretching, pec stretching, trial kinesiotaping, IASTM to RC and Upper trap, pec minor    Activity/Treatment Tolerance:  [x]  Patient tolerated treatment well  []  Patient limited by fatigue  []  Patient limited by pain   []  Patient limited by other medical complications  []  Other:     Assessment: Pt. Presents for OT td with 4 year history of left shoulder pain. Pt. Lifts weights on a regular basis. Has tried chiropratic care for 2 months which provided TENS and dry needling. Pt. Still having pain in posterior shoulder. Pt. Appears to have tight upper trap and tight posterior capsule. Pt. Would benefit from skilled OT for modalities, stretching, taping and STM for decreased pain and improved kinetics of shoulder  Areas for Improvement: impaired ROM and pain  Prognosis: good    GOALS:  Patient Goal: to decrease discomfort in left shoulder for return to weightlifting    Short Term Goals:  Time Frame: 4 weeks  1. Pt. Will demo understanding of HEP for stretching for decreased pain left shoulder  2. Pt. Will report decreased pain in left shoulder to no greater than 2/10 at any time for ease with resistive activities and pushing self up from floor        Long Term Goals:  Time Frame: 6-8 weeks  1. Pt. Will report no pain in left shoulder when lifting his toddler. 2. Pt. Will be able to get up from floor, pushing with LUE without pain  3. Pt. Will be able to complete house repairs and yardwork with pain in left shoulder      Patient Education:   [x]  HEP/Education Completed: Plan of Care, Goals, purpose and care of kinesiotape   [address] [place] [address] for HEP: sidelying sleeper stretch, upper trap stretch, cross body stretch, corner stretch  []  No new Education completed  []  Reviewed Prior HEP      [x]  Patient verbalized and/or demonstrated understanding of education provided. []  Patient unable to verbalize and/or demonstrate understanding of education provided. Will continue education. [x]  Barriers to learning:  none    PLAN:  Treatment Recommendations: Manual Therapy - Soft Tissue Mobilization, Home Exercise Program, Patient Education, Modalities and stretching    [x]  Plan of care initiated.   Plan to see patient 2 times per week for 8 weeks to address the treatment

## 2022-05-18 ENCOUNTER — TELEPHONE (OUTPATIENT)
Dept: FAMILY MEDICINE CLINIC | Age: 37
End: 2022-05-18

## 2022-05-18 NOTE — TELEPHONE ENCOUNTER
Patient was seen by Dr. Catie Vital last, I silvina precepted that day. Dr. Catie Vital is now on Paternity Leave.

## 2022-05-23 ENCOUNTER — TELEPHONE (OUTPATIENT)
Dept: FAMILY MEDICINE CLINIC | Age: 37
End: 2022-05-23

## 2022-05-23 ENCOUNTER — HOSPITAL ENCOUNTER (OUTPATIENT)
Dept: GENERAL RADIOLOGY | Age: 37
Discharge: HOME | End: 2022-05-23
Payer: COMMERCIAL

## 2022-05-23 ENCOUNTER — HOSPITAL ENCOUNTER (OUTPATIENT)
Age: 37
Discharge: HOME | End: 2022-05-23
Payer: COMMERCIAL

## 2022-05-23 DIAGNOSIS — M25.512 CHRONIC LEFT SHOULDER PAIN: ICD-10-CM

## 2022-05-23 DIAGNOSIS — G89.29 CHRONIC LEFT SHOULDER PAIN: ICD-10-CM

## 2022-05-23 PROCEDURE — 73030 X-RAY EXAM OF SHOULDER: CPT

## 2022-05-23 NOTE — RESULT ENCOUNTER NOTE
Omid Samuel,    Your X-ray results show some deformity in your clavicle, which could be a old fracture that healed. Otherwise, you do have some mild arthritis in your shoulder joint, as well.      Dr. Micheline Wright

## 2022-05-23 NOTE — TELEPHONE ENCOUNTER
Written by Jesus Urbano MD on 5/23/2022  1:56 PM EDT View Full Comments  Seen by patient Jose Landers on 5/23/2022  2:50 PM

## 2022-05-23 NOTE — TELEPHONE ENCOUNTER
"  Problem: Infant Inpatient Plan of Care  Goal: Plan of Care Review  Outcome: Ongoing, Progressing  Flowsheets (Taken 2022)  Care Plan Reviewed With:   mother   father  Goal: Patient-Specific Goal (Individualized)  Description: "I want to breastfeed my baby"  Outcome: Ongoing, Progressing  Flowsheets (Taken 2022)  Patient/Family-Specific Goals (Include Timeframe): I want to breastfeed  Goal: Absence of Hospital-Acquired Illness or Injury  Outcome: Ongoing, Progressing  Intervention: Identify and Manage Fall/Drop Risk  Flowsheets (Taken 2022)  Safety Factors:   bulb syringe readily available   electronic transponder on/activated   ID bands on   ID verified  Intervention: Prevent Infection  Flowsheets (Taken 2022)  Infection Prevention: hand hygiene promoted  Goal: Optimal Comfort and Wellbeing  Outcome: Ongoing, Progressing  Intervention: Provide Person-Centered Care  Flowsheets (Taken 2022)  Psychosocial Support:   care explained to patient/family prior to performing   questions encouraged/answered  Goal: Readiness for Transition of Care  Outcome: Ongoing, Progressing     Problem: Hypoglycemia ([place])  Goal: Glucose Stability  Outcome: Ongoing, Progressing     Problem: Infection ()  Goal: Absence of Infection Signs and Symptoms  Outcome: Ongoing, Progressing     Problem: Oral Nutrition ([place])  Goal: Effective Oral Intake  Outcome: Ongoing, Progressing  Intervention: Promote Effective Oral Intake  Flowsheets (Taken 2022)  Feeding Interventions:   feeding cues monitored   latch assistance provided     Problem: Infant-Parent Attachment ()  Goal: Demonstration of Attachment Behaviors  Outcome: Ongoing, Progressing  Intervention: Promote Infant-Parent Attachment  Flowsheets (Taken 2022)  Psychosocial Support:   care explained to patient/family prior to performing   questions encouraged/answered  Parent/Child Attachment Promotion: " ----- Message from Barb Bose MD sent at 5/23/2022  1:56 PM EDT -----  Sushma Husain,    Your X-ray results show some deformity in your clavicle, which could be a old fracture that healed. Otherwise, you do have some mild arthritis in your shoulder joint, as well.      Dr. Aries Lemus caring behavior modeled     Problem: Pain ([place])  Goal: Acceptable Level of Comfort and Activity  Outcome: Ongoing, Progressing     Problem: Respiratory Compromise ([place])  Goal: Effective Oxygenation and Ventilation  Outcome: Ongoing, Progressing  Intervention: Optimize Oxygenation and Ventilation  Flowsheets (Taken 2022)  Airway/Ventilation Management (Infant): airway patency maintained     Problem: Skin Injury ([place])  Goal: Skin Health and Integrity  Outcome: Ongoing, Progressing     Problem: Temperature Instability ()  Goal: Temperature Stability  Outcome: Ongoing, Progressing     Problem: Breastfeeding  Goal: Effective Breastfeeding  Outcome: Ongoing, Progressing  Intervention: Promote Effective Breastfeeding  Flowsheets (Taken 2022)  Breastfeeding Support:   assisted with positioning   feeding on demand promoted   assisted with latch  Intervention: Support Exclusive Breastfeed Success  Flowsheets (Taken 2022)  Psychosocial Support:   care explained to patient/family prior to performing   questions encouraged/answered  Parent/Child Attachment Promotion: caring behavior modeled

## 2022-05-24 ENCOUNTER — HOSPITAL ENCOUNTER (OUTPATIENT)
Dept: OCCUPATIONAL THERAPY | Age: 37
Setting detail: THERAPIES SERIES
Discharge: HOME | End: 2022-05-24
Payer: COMMERCIAL

## 2022-05-24 PROCEDURE — 97110 THERAPEUTIC EXERCISES: CPT

## 2022-05-24 NOTE — PROGRESS NOTES
[address]  [] EVALUATION  [x] DAILY NOTE (LAND) [] DAILY NOTE (AQUATIC ) [] PROGRESS NOTE [] DISCHARGE NOTE    [x] OUTPATIENT REHABILITATION [facility]   [] [place] [id number]    [] [facility]   [] Trudy Farris    Date: 2022  Patient Name:  Travis Barahona  : 1985  MRN: [id number]  CSN: [id number]    Referring Practitioner Cata Bedoya MD   Diagnosis Pain in left shoulder [M25.512]    Treatment Diagnosis Pain left shoulder   Date of Evaluation 22      Functional Outcome Measure Used UEFS   Functional Outcome Score 68/80 (22)       Insurance: Primary: Payor: [address] [place] [address]  [address] /  /  / ,   Secondary:    Authorization Information: Medicaid, no precert needed, allow 30 visits of OT per year   Visit # 2, 2/10 for progress note   Visits Allowed: 30   Recertification Date: 2022   Physician Follow-Up: Pt. To call for appointment if therapy does not help   Physician Orders: OT eval and treat   Pertinent History: Pt. Reports a 4 year history of left shoulder pain. States he had a baby daughter and was holding her frequently in left arm. States he has seen a chiropractor for past 2 months for this and the chiropractor uses a TENS unit- states this helps a little bit Pt. States he does lift weights. Doctor has ordered an xray however pt. Has not gotten it done yet.     22 x-rays of the left shoulder revealed     1. Slight deformity of the distal left clavicle. Mild degenerative change involving the acromioclavicular joint. 2. Otherwise negative left shoulder radiographs. SUBJECTIVE: Pt. Reports xray reports an old clavicle fracture. During US this date and discussion of x-rays, pt reports increased lightheadedness. BP taken in sitting 93/60, Requested for pt to lay supine. In supine /82, 118/78. Upon returning to sitting after supine, ex 116/78, standing after /80.  Pt does report having an EKG - normal result when he had episodes of lightheadedness. TREATMENT   Precautions: general precautions Pt. States they tried dry needling at chiropractor and he had a vasovagal response. Decreased BP noted during US. Pain: 2/10 pain in posterior left shoulder     X in shaded column indicates Activity Completed Today   Modalities Parameters/  Location  Notes/Comments   100 %  Cont US completed in sitting 1 mHz, 1 nguyen x cm2 x 4 min  x During US pt c/o lightheaded , feeling like he was going to pass out. BP taken 93/68 sitting. Pt symptomatic, and therapist requested for him to lay supine. Discontinued US                Manual Therapy Time/  Technique  Notes/Comments   IASTM left anterior sh, upper trap, posterior trap area 5 min x    sidelying pectoralis minor stretch with arm behind pt 5- 10 sec hold x          Exercises   Sets/  Sec Reps  Notes/Comments   sidelying sleeper stretch 15 sec      Cross body posterior capsule stretch 15 sec  x    Corner stretch 15 sec  x    Upper trap stretch 15 sec  x    Supine sh circles with 2 # wt, CW, CCW serratus punch 1 12 x                  Activities Time    Notes/Comments   Applied kinesiotape to left shoulder in Y strip for upper trap inhibition deltoid inhibition, and 1 strip anterior to posterior for mechanical correction  x                  Specific Interventions Next Treatment: , posterior capsule and upper trap stretching, pec stretching, trial kinesiotaping, IASTM to RC and Upper trap, pec minor    Activity/Treatment Tolerance:  [x]  Patient tolerated treatment well  []  Patient limited by fatigue  []  Patient limited by pain   []  Patient limited by other medical complications  []  Other:     Assessment: Pt. Presents for OT session after evaluation. Initiated US this date. During the [address], pt complaints of being lightheaded. Assisted pt to lay supine . BP taken as detailed above.  Resumed stretching program.    Areas for Improvement: impaired ROM

## 2022-05-25 ENCOUNTER — TELEPHONE (OUTPATIENT)
Dept: FAMILY MEDICINE CLINIC | Age: 37
End: 2022-05-25

## 2022-05-25 DIAGNOSIS — G89.29 CHRONIC LEFT SHOULDER PAIN: Primary | ICD-10-CM

## 2022-05-25 DIAGNOSIS — M25.512 CHRONIC LEFT SHOULDER PAIN: Primary | ICD-10-CM

## 2022-05-25 NOTE — TELEPHONE ENCOUNTER
----- Message from Abhay Aleman sent at 5/25/2022 12:23 PM EDT -----  Subject: Referral Request    QUESTIONS   Reason for referral request? orthopedic   Has the physician seen you for this condition before? No   Preferred Specialist (if applicable)? Do you already have an appointment scheduled? No  Additional Information for Provider? Please send to [facility] on [facility] [address]  ---------------------------------------------------------------------------  --------------  [address]  What is the best way for the office to contact you? OK to leave message on   voicemail  Preferred Call Back Phone Number? [phone]  ---------------------------------------------------------------------------  --------------  SCRIPT ANSWERS  Relationship to Patient?  Self

## 2022-05-26 NOTE — TELEPHONE ENCOUNTER
Referral placed to [facility]. Not able to print from my laptop - just JANELLE. Thank you.      Electronically signed by Lian Snyder MD on 5/26/2022 at 7:36 AM

## 2022-06-01 ENCOUNTER — HOSPITAL ENCOUNTER (OUTPATIENT)
Dept: OCCUPATIONAL THERAPY | Age: 37
Setting detail: THERAPIES SERIES
End: 2022-06-01
Payer: COMMERCIAL

## 2022-06-07 ENCOUNTER — HOSPITAL ENCOUNTER (OUTPATIENT)
Dept: OCCUPATIONAL THERAPY | Age: 37
Setting detail: THERAPIES SERIES
Discharge: HOME | End: 2022-06-07
Payer: COMMERCIAL

## 2022-06-07 PROCEDURE — 97110 THERAPEUTIC EXERCISES: CPT

## 2022-06-07 NOTE — PROGRESS NOTES
[address]ERAPY  [] EVALUATION  [x] DAILY NOTE (LAND) [] DAILY NOTE (AQUATIC ) [] PROGRESS NOTE [] DISCHARGE NOTE    [x] OUTPATIENT REHABILITATION [facility]   [] [place] [address]    [] [facility]   [] Brit Franklin    Date: 2022  Patient Name:  Sudeep Campos  : 1985  MRN: [id number]  CSN: [id number]    Referring Practitioner Dennise Snow MD   Diagnosis Pain in left shoulder [M25.512]    Treatment Diagnosis Pain left shoulder   Date of Evaluation 22      Functional Outcome Measure Used UEFS   Functional Outcome Score 68/80 (22)       Insurance: Primary: Payor: [address] [place] [address]  [address] /  /  / ,   Secondary:    Authorization Information: Medicaid, no precert needed, allow 30 visits of OT per year   Visit # 3, 3/10 for progress note   Visits Allowed: 30   Recertification Date: 2022   Physician Follow-Up: Pt. To call for appointment if therapy does not help   Physician Orders: OT eval and treat   Pertinent History: Pt. Reports a 4 year history of left shoulder pain. States he had a baby daughter and was holding her frequently in left arm. States he has seen a chiropractor for past 2 months for this and the chiropractor uses a TENS unit- states this helps a little bit Pt. States he does lift weights. Doctor has ordered an xray however pt. Has not gotten it done yet.     22 x-rays of the left shoulder revealed     1. Slight deformity of the distal left clavicle. Mild degenerative change involving the acromioclavicular joint. 2. Otherwise negative left shoulder radiographs. SUBJECTIVE: Pt. Reports he went back to [facility] for recheck on  and was told that his xray did NOT show an old clavicle fracture. Pt. States his left shoulder is feeling much better, states the kinesiotape was very helpful. TREATMENT   Precautions: general precautions Pt.  States they tried dry needling at chiropractor and he had a vasovagal response. Decreased BP noted during US. Pain: no pain in left shoulder today     X in shaded column indicates Activity Completed Today   Modalities Parameters/  Location  Notes/Comments   100 %  Cont US completed in sitting 1 mHz, 1 nguyen x cm2 x 4 min   No ultrasound today as pt. Is reporting decreased pain                Manual Therapy Time/  Technique  Notes/Comments   IASTM left anterior sh, upper trap, posterior trap area 5 min  Upper trap does not feel tight today   sidelying pectoralis minor stretch with arm behind pt 5- 10 sec hold           Exercises   Sets/  Sec Reps  Notes/Comments   sidelying sleeper stretch 15 sec 3 X    Supine over bolster with hands behind head for pec stretch 2 min  X    Cross body posterior capsule stretch 15 sec      Corner stretch 15 sec      Upper trap stretch 15 sec      Supine SA punch with 4# 1 15 X    Prone Houghstons using 6# for rows, 2# for all other directions 1 15 ea X Weakness noted with horizontal abduction and scaption   Prone ball lift BUE over end of mat 1 15 X                         Activities Time    Notes/Comments   Applied kinesiotape to left shoulder in Y strip for upper trap inhibition deltoid inhibition, and 1 strip anterior to posterior for mechanical correction  X Instructed pt. On how to tape shoulder and have handouts from kinesiotaping book.                   Specific Interventions Next Treatment: , posterior capsule and upper trap stretching, pec stretching, trial kinesiotaping, IASTM to RC and Upper trap, pec minor    Activity/Treatment Tolerance:  [x]  Patient tolerated treatment well  []  Patient limited by fatigue  []  Patient limited by pain   []  Patient limited by other medical complications  []  Other:     Assessment: Pt. States he was now told by ortho that he did not have an old clavicle fracture and that his xrays were normal. Pt. Reporting significant decrease in shoulder pain today, so discontinued ultrasound and focused on stretching and periscapular strengthening today. Weakness noted with prone exercises today    Areas for Improvement: impaired ROM and pain  Prognosis: good    GOALS:  Patient Goal: to decrease discomfort in left shoulder for return to weightlifting    Short Term Goals:  Time Frame: 4 weeks  1. Pt. Will demo understanding of HEP for stretching for decreased pain left shoulder  2. Pt. Will report decreased pain in left shoulder to no greater than 2/10 at any time for ease with resistive activities and pushing self up from floor        Long Term Goals:  Time Frame: 6-8 weeks  1. Pt. Will report no pain in left shoulder when lifting his toddler. 2. Pt. Will be able to get up from floor, pushing with LUE without pain  3. Pt. Will be able to complete house repairs and yardwork with pain in left shoulder      Patient Education:   [x]  HEP/Education Completed: Plan of Care, Goals, purpose and care of kinesiotape   Tristian Lee for HEP: sidelying sleeper stretch, upper trap stretch, cross body stretch, corner stretch  []  No new Education completed  [x]  Educated on how to complete kinesiotaping and gave handouts      [x]  Patient verbalized and/or demonstrated understanding of education provided. []  Patient unable to verbalize and/or demonstrate understanding of education provided. Will continue education. [x]  Barriers to learning:  none    PLAN:  Treatment Recommendations: Manual Therapy - Soft Tissue Mobilization, Home Exercise Program, Patient Education, Modalities and stretching    [x]  Plan of care initiated. Plan to see patient 2 times per week for 8 weeks to address the treatment planned outlined above.   []  Continue with current plan of care  []  Modify plan of care as follows:    []  Hold pending physician visit  []  Discharge    Time In 1130   Time Out 1200   Timed Code Minutes: 30 min   Total Treatment Time: 30 min       Electronically Signed by: KATHY Davenport/L [id number]

## 2022-06-08 ENCOUNTER — HOSPITAL ENCOUNTER (OUTPATIENT)
Dept: OCCUPATIONAL THERAPY | Age: 37
Setting detail: THERAPIES SERIES
Discharge: HOME | End: 2022-06-08
Payer: COMMERCIAL

## 2022-06-08 PROCEDURE — 97110 THERAPEUTIC EXERCISES: CPT

## 2022-06-08 NOTE — PROGRESS NOTES
[address]ER[address]  [] EVALUATION  [x] DAILY NOTE (LAND) [] DAILY NOTE (AQUATIC ) [] PROGRESS NOTE [] DISCHARGE NOTE    [x] OUTPATIENT REHABILITATION [facility]   [] [place] [address]    [] [facility]   [] [facility]    Date: 2022  Patient Name:  Agustin Vital  : 1985  MRN: [id number]  CSN: [id number]    Referring Practitioner Miguel Tobias MD   Diagnosis Pain in left shoulder [M25.512]    Treatment Diagnosis Pain left shoulder   Date of Evaluation 22      Functional Outcome Measure Used UEFS   Functional Outcome Score 68/80 (22)       Insurance: Primary: Payor: [address] [place] [address]  [address] /  /  / ,   Secondary:    Authorization Information: Medicaid, no precert needed, allow 30 visits of OT per year   Visit # 4, 4/10 for progress note   Visits Allowed: 30   Recertification Date: 2022   Physician Follow-Up: Pt. To call for appointment if therapy does not help   Physician Orders: OT eval and treat   Pertinent History: Pt. Reports a 4 year history of left shoulder pain. States he had a baby daughter and was holding her frequently in left arm. States he has seen a chiropractor for past 2 months for this and the chiropractor uses a TENS unit- states this helps a little bit Pt. States he does lift weights. Doctor has ordered an xray however pt. Has not gotten it done yet.     22 x-rays of the left shoulder revealed     1. Slight deformity of the distal left clavicle. Mild degenerative change involving the acromioclavicular joint. 2. Otherwise negative left shoulder radiographs. SUBJECTIVE: Pt. Reports his left shoulder is feeling much better, states the kinesiotape was very helpful. Good tolerance for strengthening ex initiated last session . TREATMENT   Precautions: general precautions Pt. States they tried dry needling at chiropractor and he had a vasovagal response.  Decreased BP noted during US.    Pain: no pain in left shoulder today     X in shaded column indicates Activity Completed Today   Modalities Parameters/  Location  Notes/Comments   100 %  Cont US completed in sitting 1 mHz, 1 nguyen x cm2 x 4 min   No ultrasound today as pt. Is reporting decreased pain                Manual Therapy Time/  Technique  Notes/Comments   IASTM left anterior sh, upper trap, posterior trap area 5 min  Upper trap does not feel tight today   sidelying pectoralis minor stretch with arm behind pt 5- 10 sec hold           Exercises   Sets/  Sec Reps  Notes/Comments   sidelying sleeper stretch 30 sec 3 X    Supine over bolster with hands behind head for pec stretch 1 min 2 X    Cross body posterior capsule stretch 15 sec      Corner stretch 15 sec  x    Upper trap stretch 15 sec      Supine SA punch with 4# 1 15 X    Prone [employer]oughstons using 6# for rows, 2# for all other directions 1 15 ea X Weakness noted with horizontal abduction and scaption   Prone ball lift BUE over end of mat 1 15 X    Blue theraband for lat pull downs and rows with pt stopping at neutral vs stretching too far into extension 1 15 x                  Activities Time    Notes/Comments   Applied kinesiotape to left shoulder in Y strip for upper trap inhibition deltoid inhibition, and 1 strip anterior to posterior for mechanical correction   Did not complete this date.  Still staying well on skin from prior session                 Specific Interventions Next Treatment: , posterior capsule and upper trap stretching, pec stretching, trial kinesiotaping, IASTM to RC and Upper trap, pec minor    Activity/Treatment Tolerance:  [x]  Patient tolerated treatment well  []  Patient limited by fatigue  []  Patient limited by pain   []  Patient limited by other medical complications  []  Other:     Assessment: Pt. States he was now told by ortho that he did not have an old clavicle fracture and that his xrays were normal. Pt. Reporting significant decrease in by:Sigrid [address], ALISHA OTR/L [id number]

## 2022-06-14 ENCOUNTER — HOSPITAL ENCOUNTER (OUTPATIENT)
Dept: OCCUPATIONAL THERAPY | Age: 37
Setting detail: THERAPIES SERIES
Discharge: HOME | End: 2022-06-14
Payer: COMMERCIAL

## 2022-06-14 PROCEDURE — 97110 THERAPEUTIC EXERCISES: CPT

## 2022-06-14 NOTE — PROGRESS NOTES
[address]  [] EVALUATION  [x] DAILY NOTE (LAND) [] DAILY NOTE (AQUATIC ) [] PROGRESS NOTE [] DISCHARGE NOTE    [x] OUTPATIENT REHABILITATION [facility]   [] [place] [address]    [] [facility]   [] Alexia Opitz    Date: 2022  Patient Name:  Brian Smith  : 1985  MRN: [id number]  CSN: [id number]    Referring Practitioner Hadley Christy MD   Diagnosis Pain in left shoulder [M25.512]    Treatment Diagnosis Pain left shoulder   Date of Evaluation 22      Functional Outcome Measure Used UEFS   Functional Outcome Score 68/80 (22)       Insurance: Primary: Payor: [address] [place] [address]  [address] /  /  / ,   Secondary:    Authorization Information: Medicaid, no precert needed, allow 30 visits of OT per year   Visit # 5, 5/10 for progress note   Visits Allowed: 30   Recertification Date: 2022   Physician Follow-Up: Pt. To call for appointment if therapy does not help   Physician Orders: OT eval and treat   Pertinent History: Pt. Reports a 4 year history of left shoulder pain. States he had a baby daughter and was holding her frequently in left arm. States he has seen a chiropractor for past 2 months for this and the chiropractor uses a TENS unit- states this helps a little bit Pt. States he does lift weights. Doctor has ordered an xray however pt. Has not gotten it done yet.     22 x-rays of the left shoulder revealed     1. Slight deformity of the distal left clavicle. Mild degenerative change involving the acromioclavicular joint. 2. Otherwise negative left shoulder radiographs. SUBJECTIVE: Pt. Reports his left shoulder remains pain free today. Tolerating strengthening well    TREATMENT   Precautions: general precautions Pt. States they tried dry needling at chiropractor and he had a vasovagal response. Decreased BP noted during US.     Pain: no pain in left shoulder today     X in shaded column indicates Activity Completed Today   Modalities Parameters/  Location  Notes/Comments   100 %  Cont US completed in sitting 1 mHz, 1 nguyen x cm2 x 4 min   No ultrasound today as pt. Is reporting decreased pain                Manual Therapy Time/  Technique  Notes/Comments   IASTM left anterior sh, upper trap, posterior trap area 5 min  Upper trap does not feel tight today   sidelying pectoralis minor stretch with arm behind pt 5- 10 sec hold           Exercises   Sets/  Sec Reps  Notes/Comments   sidelying sleeper stretch 30 sec 3 Xx    Supine over bolster with hands behind head for pec stretch 1 min 2 Xx Attempted but pt. Getting pins and needles in left arm so stopped   Cross body posterior capsule stretch 15 sec      Corner stretch 15 sec 3 Xx    Upper trap stretch 15 sec      Supine SA punch with 4# 1 15 Xx    Prone Houghstons using 6# for rows, 2# for all other directions 1 15 ea Xx Weakness noted with horizontal abduction and scaption   Prone ball lift BUE over end of mat 1 15 Xx    Blue theraband for lat pull downs and rows with pt stopping at neutral vs stretching too far into extension 1 15 Xx                  Activities Time    Notes/Comments   Applied kinesiotape to left shoulder in Y strip for upper trap inhibition deltoid inhibition, and 1 strip anterior to posterior for mechanical correction   Did not tape today - pt.  Wants to see how he does                 Specific Interventions Next Treatment: , posterior capsule and upper trap stretching, pec stretching, trial kinesiotaping, IASTM to RC and Upper trap, pec minor    Activity/Treatment Tolerance:  [x]  Patient tolerated treatment well  []  Patient limited by fatigue  []  Patient limited by pain   []  Patient limited by other medical complications  []  Other:     Assessment: Pt. States he     Areas for Improvement: impaired ROM and pain  Prognosis: good    GOALS:  Patient Goal: to decrease discomfort in left shoulder for return to weightlifting    Short Term Goals:  Time Frame: 4 weeks  1. Pt. Will demo understanding of HEP for stretching for decreased pain left shoulder  2. Pt. Will report decreased pain in left shoulder to no greater than 2/10 at any time for ease with resistive activities and pushing self up from floor        Long Term Goals:  Time Frame: 6-8 weeks  1. Pt. Will report no pain in left shoulder when lifting his toddler. 2. Pt. Will be able to get up from floor, pushing with LUE without pain  3. Pt. Will be able to complete house repairs and yardwork with pain in left shoulder      Patient Education:   [x]  HEP/Education Completed: Plan of Care, Goals, purpose and care of kinesiotape   [address] [place] [address] for HEP: sidelying sleeper stretch, upper trap stretch, cross body stretch, corner stretch  [x]  No new Education completed  []  Educated on how to complete kinesiotaping and gave handouts      []  Patient verbalized and/or demonstrated understanding of education provided. []  Patient unable to verbalize and/or demonstrate understanding of education provided. Will continue education. [x]  Barriers to learning:  none    PLAN:  Treatment Recommendations: Manual Therapy - Soft Tissue Mobilization, Home Exercise Program, Patient Education, Modalities and stretching    [x]  Plan of care initiated. Plan to see patient 2 times per week for 8 weeks to address the treatment planned outlined above.   []  Continue with current plan of care  []  Modify plan of care as follows:    []  Hold pending physician visit  []  Discharge    Time In 1300   Time Out 1325   Timed Code Minutes: 25 min   Total Treatment Time: 25 min       Electronically Signed by: KATHY Weir/MITA [id number]

## 2022-06-15 ENCOUNTER — HOSPITAL ENCOUNTER (OUTPATIENT)
Dept: OCCUPATIONAL THERAPY | Age: 37
Setting detail: THERAPIES SERIES
Discharge: HOME | End: 2022-06-15
Payer: COMMERCIAL

## 2022-06-15 PROCEDURE — 97110 THERAPEUTIC EXERCISES: CPT

## 2022-06-15 NOTE — DISCHARGE SUMMARY
[address]  [] EVALUATION  [] DAILY NOTE (LAND) [] DAILY NOTE (AQUATIC ) [] PROGRESS NOTE [x] DISCHARGE NOTE    [x] OUTPATIENT REHABILITATION [facility]   [] [place] [address]    [] [facility]   [] [facility]    Date: 6/15/2022  Patient Name:  Nubia Aguirre  : 1985  MRN: [id number]  CSN: [id number]    Referring Practitioner Shawn Dickerson MD   Diagnosis Pain in left shoulder [M25.512]    Treatment Diagnosis Pain left shoulder   Date of Evaluation 22      Functional Outcome Measure Used UEFS   Functional Outcome Score 68/80 (22)       Insurance: Primary: Payor: [address] [place] [address]  [address] /  /  / ,   Secondary:    Authorization Information: Medicaid, no precert needed, allow 30 visits of OT per year   Visit # 6, 6/10 for progress note   Visits Allowed: 30   Recertification Date: 2022   Physician Follow-Up: Pt. To call for appointment if therapy does not help   Physician Orders: OT eval and treat   Pertinent History: Pt. Reports a 4 year history of left shoulder pain. States he had a baby daughter and was holding her frequently in left arm. States he has seen a chiropractor for past 2 months for this and the chiropractor uses a TENS unit- states this helps a little bit Pt. States he does lift weights. Doctor has ordered an xray however pt. Has not gotten it done yet.     22 x-rays of the left shoulder revealed     1. Slight deformity of the distal left clavicle. Mild degenerative change involving the acromioclavicular joint. 2. Otherwise negative left shoulder radiographs. SUBJECTIVE: Pt reports he is no longer having pain in the shoulder. Knows exercises to continue on his own. TREATMENT   Precautions: general precautions Pt. States they tried dry needling at chiropractor and he had a vasovagal response. Decreased BP noted during US.     Pain: no pain in left shoulder today     X in shaded column indicates Activity Completed Today   Modalities Parameters/  Location  Notes/Comments   100 %  Cont US completed in sitting 1 mHz, 1 nguyen x cm2 x 4 min   No ultrasound today as pt. Is reporting decreased pain                Manual Therapy Time/  Technique  Notes/Comments   IASTM left anterior sh, upper trap, posterior trap area 5 min  Upper trap does not feel tight today   sidelying pectoralis minor stretch with arm behind pt 5- 10 sec hold           Exercises   Sets/  Sec Reps  Notes/Comments   sidelying sleeper stretch 30 sec 3 X    Supine over bolster with hands behind head for pec stretch 1 min 2  Attempted but pt. Getting pins and needles in left arm so stopped   Cross body posterior capsule stretch 15 sec      Corner stretch 15 sec 3 X    Upper trap stretch 15 sec      Supine SA punch with 4# 1 15     Prone [employer]ough[employer]ons using 6# for rows, 2# for all other directions 1 15 ea X Reviewed for HEP   Prone ball lift BUE over end of mat 1 15     Blue theraband for lat pull downs and rows with pt stopping at neutral vs stretching too far into extension 1 15                   Activities Time    Notes/Comments   Applied kinesiotape to left shoulder in Y strip for upper trap inhibition deltoid inhibition, and 1 strip anterior to posterior for mechanical correction   Did not tape today - pt.  Wants to see how he does                 Specific Interventions Next Treatment: , posterior capsule and upper trap stretching, pec stretching, trial kinesiotaping, IASTM to RC and Upper trap, pec minor    Activity/Treatment Tolerance:  [x]  Patient tolerated treatment well  []  Patient limited by fatigue  []  Patient limited by pain   []  Patient limited by other medical complications  []  Other:     Assessment: Pt. States he     Areas for Improvement: impaired ROM and pain  Prognosis: good    GOALS:  Patient Goal: to decrease discomfort in left shoulder for return to weightlifting    Short Term Goals:  Time Frame: 4 weeks  1. Pt. Will demo understanding of HEP for stretching for decreased pain left shoulder. GOAL MET  2. Pt. Will report decreased pain in left shoulder to no greater than 2/10 at any time for ease with resistive activities and pushing self up from floor. GOAL MET        Long Term Goals:  Time Frame: 6-8 weeks  1. Pt. Will report no pain in left shoulder when lifting his toddler. GOAL MET  2. Pt. Will be able to get up from floor, pushing with LUE without pain. GOAL MET  3. Pt. Will be able to complete house repairs and yardwork with pain in left shoulder. GOAL MET      Patient Education:   [x]  HEP/Education Completed: Plan of Care, Goals, purpose and care of kinesiotape   [address] [place] [address] for HEP: sidelying sleeper stretch, upper trap stretch, cross body stretch, corner stretch  [x]  No new Education completed  []  Educated on how to complete kinesiotaping and gave handouts      []  Patient verbalized and/or demonstrated understanding of education provided. []  Patient unable to verbalize and/or demonstrate understanding of education provided. Will continue education. [x]  Barriers to learning:  none    PLAN:  Treatment Recommendations: Manual Therapy - Soft Tissue Mobilization, Home Exercise Program, Patient Education, Modalities and stretching    []  Plan of care initiated. Plan to see patient 2 times per week for 8 weeks to address the treatment planned outlined above.   []  Continue with current plan of care  []  Modify plan of care as follows:    []  Hold pending physician visit  [x]  Discharge    Time In 1345   Time Out 1400   Timed Code Minutes: 15 min   Total Treatment Time: 15 min       Electronically Signed by: Christiano Clarke OTR/MITA, CHT #[id number]

## 2024-03-13 ENCOUNTER — HOSPITAL ENCOUNTER (EMERGENCY)
Age: 39
Discharge: HOME | End: 2024-03-13
Payer: COMMERCIAL

## 2024-03-13 VITALS
TEMPERATURE: 98.3 F | HEART RATE: 91 BPM | RESPIRATION RATE: 20 BRPM | BODY MASS INDEX: 31.32 KG/M2 | SYSTOLIC BLOOD PRESSURE: 142 MMHG | OXYGEN SATURATION: 98 % | WEIGHT: 200 LBS | DIASTOLIC BLOOD PRESSURE: 83 MMHG

## 2024-03-13 DIAGNOSIS — K06.9 GINGIVAL AND PERIODONTAL DISEASE: Primary | ICD-10-CM

## 2024-03-13 DIAGNOSIS — K05.6 GINGIVAL AND PERIODONTAL DISEASE: Primary | ICD-10-CM

## 2024-03-13 DIAGNOSIS — K06.8 GINGIVAL BLEEDING: ICD-10-CM

## 2024-03-13 PROCEDURE — 99213 OFFICE O/P EST LOW 20 MIN: CPT

## 2024-03-13 RX ORDER — PENICILLIN V POTASSIUM 500 MG/1
500 TABLET ORAL 4 TIMES DAILY
Qty: 40 TABLET | Refills: 0 | Status: SHIPPED | OUTPATIENT
Start: 2024-03-13 | End: 2024-03-23

## 2024-03-13 RX ORDER — CHLORHEXIDINE GLUCONATE ORAL RINSE 1.2 MG/ML
15 SOLUTION DENTAL 2 TIMES DAILY
Qty: 420 ML | Refills: 0 | Status: SHIPPED | OUTPATIENT
Start: 2024-03-13 | End: 2024-03-27

## 2024-03-13 ASSESSMENT — PAIN DESCRIPTION - PAIN TYPE: TYPE: ACUTE PAIN

## 2024-03-13 ASSESSMENT — PAIN SCALES - GENERAL: PAINLEVEL_OUTOF10: 4

## 2024-03-13 ASSESSMENT — PAIN DESCRIPTION - ORIENTATION: ORIENTATION: LEFT;UPPER

## 2024-03-13 ASSESSMENT — PAIN DESCRIPTION - DESCRIPTORS: DESCRIPTORS: DISCOMFORT

## 2024-03-13 ASSESSMENT — PAIN - FUNCTIONAL ASSESSMENT: PAIN_FUNCTIONAL_ASSESSMENT: 0-10

## 2024-03-13 ASSESSMENT — PAIN DESCRIPTION - FREQUENCY: FREQUENCY: CONTINUOUS

## 2024-03-13 ASSESSMENT — PAIN DESCRIPTION - LOCATION: LOCATION: TEETH

## 2024-03-13 NOTE — ED PROVIDER NOTES
[facility] URGENT CARE  Urgent Care Encounter       CHIEF COMPLAINT       Chief Complaint   Patient presents with    Dental Problem     Left, upper       Nurses Notes reviewed and I agree except as noted in the HPI.  HISTORY OF PRESENT ILLNESS   Binu Lerma is a 38 y.o. male who presents with complaints of dental pain and gum bleeding. Pt reports symptoms started 2 weeks ago. Pt reports using a new gum sensitive toothpaste without relieve.    The history is provided by the patient.       REVIEW OF SYSTEMS     Review of Systems   HENT:  Positive for dental problem.    All other systems reviewed and are negative.      PAST MEDICAL HISTORY         Diagnosis Date    Diverticular disease 2008    Diverticulitis        SURGICALHISTORY     Patient  has a past surgical history that includes cyst removal; other surgical history (6/11/14); and Ureter stent placement (6-11-14).    CURRENT MEDICATIONS       Discharge Medication List as of 3/13/2024  5:02 PM          ALLERGIES     Patient is has No Known Allergies.    Patients There is no immunization history for the selected administration types on file for this patient.    FAMILY HISTORY     Patient's family history includes No Known Problems in his mother and sister; Other in his father.    SOCIAL HISTORY     Patient  reports that he has been smoking cigarettes. He started smoking about 25 years ago. He has a 25.2 pack-year smoking history. He has never used smokeless tobacco. He reports current alcohol use. He reports current drug use. Frequency: 7.00 times per week. Drug: Marijuana (Weed).    PHYSICAL EXAM     ED TRIAGE VITALS  BP: (!) 142/83, Temp: 98.3 °F (36.8 °C), Pulse: 91, Respirations: 20, SpO2: 98 %,Estimated body mass index is 31.32 kg/m² as calculated from the following:    Height as of 5/11/22: 1.702 m (5' 7\").    Weight as of this encounter: 90.7 kg (200 lb).,No LMP for male patient.    Physical Exam  Vitals and nursing note reviewed.

## 2024-04-12 ENCOUNTER — OFFICE VISIT (OUTPATIENT)
Dept: FAMILY MEDICINE CLINIC | Age: 39
End: 2024-04-12
Payer: COMMERCIAL

## 2024-04-12 VITALS
HEIGHT: 67 IN | HEART RATE: 94 BPM | WEIGHT: 205 LBS | TEMPERATURE: 98.2 F | RESPIRATION RATE: 18 BRPM | DIASTOLIC BLOOD PRESSURE: 76 MMHG | SYSTOLIC BLOOD PRESSURE: 118 MMHG | BODY MASS INDEX: 32.18 KG/M2 | OXYGEN SATURATION: 95 %

## 2024-04-12 DIAGNOSIS — N45.1 EPIDIDYMITIS: ICD-10-CM

## 2024-04-12 DIAGNOSIS — K08.89 TOOTH PAIN: ICD-10-CM

## 2024-04-12 DIAGNOSIS — K57.92 DIVERTICULITIS: Primary | ICD-10-CM

## 2024-04-12 PROCEDURE — G8427 DOCREV CUR MEDS BY ELIG CLIN: HCPCS | Performed by: STUDENT IN AN ORGANIZED HEALTH CARE EDUCATION/TRAINING PROGRAM

## 2024-04-12 PROCEDURE — 99214 OFFICE O/P EST MOD 30 MIN: CPT | Performed by: STUDENT IN AN ORGANIZED HEALTH CARE EDUCATION/TRAINING PROGRAM

## 2024-04-12 PROCEDURE — 4004F PT TOBACCO SCREEN RCVD TLK: CPT | Performed by: STUDENT IN AN ORGANIZED HEALTH CARE EDUCATION/TRAINING PROGRAM

## 2024-04-12 PROCEDURE — G8417 CALC BMI ABV UP PARAM F/U: HCPCS | Performed by: STUDENT IN AN ORGANIZED HEALTH CARE EDUCATION/TRAINING PROGRAM

## 2024-04-12 RX ORDER — CIPROFLOXACIN 500 MG/1
500 TABLET, FILM COATED ORAL 2 TIMES DAILY
Qty: 20 TABLET | Refills: 0 | Status: SHIPPED | OUTPATIENT
Start: 2024-04-12 | End: 2024-04-22

## 2024-04-12 RX ORDER — METRONIDAZOLE 500 MG/1
500 TABLET ORAL 2 TIMES DAILY
Qty: 20 TABLET | Refills: 0 | Status: SHIPPED | OUTPATIENT
Start: 2024-04-12 | End: 2024-04-22

## 2024-04-12 SDOH — ECONOMIC STABILITY: INCOME INSECURITY: HOW HARD IS IT FOR YOU TO PAY FOR THE VERY BASICS LIKE FOOD, HOUSING, MEDICAL CARE, AND HEATING?: SOMEWHAT HARD

## 2024-04-12 SDOH — ECONOMIC STABILITY: HOUSING INSECURITY
IN THE LAST 12 MONTHS, WAS THERE A TIME WHEN YOU DID NOT HAVE A STEADY PLACE TO SLEEP OR SLEPT IN A SHELTER (INCLUDING NOW)?: NO

## 2024-04-12 SDOH — ECONOMIC STABILITY: FOOD INSECURITY: WITHIN THE PAST 12 MONTHS, YOU WORRIED THAT YOUR FOOD WOULD RUN OUT BEFORE YOU GOT MONEY TO BUY MORE.: NEVER TRUE

## 2024-04-12 SDOH — ECONOMIC STABILITY: FOOD INSECURITY: WITHIN THE PAST 12 MONTHS, THE FOOD YOU BOUGHT JUST DIDN'T LAST AND YOU DIDN'T HAVE MONEY TO GET MORE.: NEVER TRUE

## 2024-04-12 ASSESSMENT — PATIENT HEALTH QUESTIONNAIRE - PHQ9
SUM OF ALL RESPONSES TO PHQ QUESTIONS 1-9: 0
SUM OF ALL RESPONSES TO PHQ QUESTIONS 1-9: 0
SUM OF ALL RESPONSES TO PHQ9 QUESTIONS 1 & 2: 0
SUM OF ALL RESPONSES TO PHQ QUESTIONS 1-9: 0
1. LITTLE INTEREST OR PLEASURE IN DOING THINGS: NOT AT ALL
2. FEELING DOWN, DEPRESSED OR HOPELESS: NOT AT ALL
SUM OF ALL RESPONSES TO PHQ QUESTIONS 1-9: 0

## 2024-04-12 NOTE — PROGRESS NOTES
I saw and evaluated the patient, performing the key elements of the service.  I discussed the findings, assessment and plan with the resident and agree with the resident's findings and plan as documented in the resident's note. GC modifier added.  
midline.  Respiratory:  Normal respiratory effort. Clear to auscultation, bilaterally without Rales/Wheezes/Rhonchi.  Cardiovascular: Regular rate and rhythm with normal S1/S2 without murmurs, rubs or gallops.  Abdomen: Left lower quadrant abdominal tenderness to palpation.  Genitourinary: Right epididymis tender to palpation, right testicle nontender, no masses appreciated of either testicle bilaterally.  Left testicle unremarkable.  Musculoskeletal: passive and active ROM x 4 extremities.  Skin: Skin color, texture, turgor normal.  No rashes or lesions.   Neurologic:  Neurovascularly intact without any focal sensory/motor deficits. Cranial nerves: II-XII intact, grossly non-focal.  Psychiatric: Alert and oriented, thought content appropriate, normal insight  Capillary Refill: Brisk,< 3 seconds   Peripheral Pulses: +2 palpable, equal bilaterally     Vitals:    04/12/24 1058   BP: 118/76   Site: Left Upper Arm   Position: Sitting   Pulse: 94   Resp: 18   Temp: 98.2 °F (36.8 °C)   TempSrc: Oral   SpO2: 95%   Weight: 93 kg (205 lb)   Height: 1.702 m (5' 7\")         An electronic signature was used to authenticate this note.    --Benjamin Boone MD

## 2024-05-22 ENCOUNTER — HOSPITAL ENCOUNTER (EMERGENCY)
Age: 39
Discharge: HOME | End: 2024-05-22
Payer: COMMERCIAL

## 2024-05-22 VITALS
RESPIRATION RATE: 20 BRPM | HEART RATE: 75 BPM | SYSTOLIC BLOOD PRESSURE: 137 MMHG | DIASTOLIC BLOOD PRESSURE: 79 MMHG | OXYGEN SATURATION: 100 % | TEMPERATURE: 98.2 F

## 2024-05-22 DIAGNOSIS — H10.9 CONJUNCTIVITIS OF LEFT EYE, UNSPECIFIED CONJUNCTIVITIS TYPE: Primary | ICD-10-CM

## 2024-05-22 PROCEDURE — 99213 OFFICE O/P EST LOW 20 MIN: CPT

## 2024-05-22 PROCEDURE — 99213 OFFICE O/P EST LOW 20 MIN: CPT | Performed by: EMERGENCY MEDICINE

## 2024-05-22 RX ORDER — CIPROFLOXACIN HYDROCHLORIDE 3.5 MG/ML
1 SOLUTION/ DROPS TOPICAL EVERY 4 HOURS
Qty: 1 EACH | Refills: 0 | Status: SHIPPED | OUTPATIENT
Start: 2024-05-22 | End: 2024-05-29

## 2024-05-22 ASSESSMENT — ENCOUNTER SYMPTOMS
EYE ITCHING: 0
EYE DISCHARGE: 1
EYE PAIN: 1
PHOTOPHOBIA: 0
EYE REDNESS: 1

## 2024-05-22 NOTE — DISCHARGE INSTRUCTIONS
Do not use contact lenses until symptoms have completely resolved x 48 hours    Use ciloxan eyedrops as directed    Good handwashing    Return for new or worsening symptom

## 2024-05-22 NOTE — ED TRIAGE NOTES
Pt to  with c/o left eye redness and drainage ongoing since May 6th. Pt has had drops from an eye doctor that he finished but reports it has not cleared up.     GERARDO/POLY/DEX 0.1% OP NADEEM New  Add as: neomycin-polymyxin-dexameth (MAXITROL) 3.5-38981-6.1 ophthalmic suspension      Pt had a follow up with his eye doctor 05/16 who cleared him to return to contacts but once he did his eye worsened.

## 2024-05-22 NOTE — ED PROVIDER NOTES
[facility] URGENT CARE  Urgent Care Encounter       CHIEF COMPLAINT       Chief Complaint   Patient presents with    Conjunctivitis     Left eye        Nurses Notes reviewed and I agree except as noted in the HPI.  HISTORY OF PRESENT ILLNESS   Binu Lerma is a 38 y.o. male who presents for left eye irritation.  Patient had pinkeye on May 16.  He was treated with Maxitrol eyedrops by his optometrist.  Symptoms improved however he has been without eyedrops for the past several days and now the irritation is beginning again.  Has drainage from the left eye.  He is a contact lens wearer but states he started with brand-new contact lenses a couple days ago.  He stopped using once the eye became irritated once again    HPI    REVIEW OF SYSTEMS     Review of Systems   Constitutional:  Negative for activity change, fatigue and fever.   Eyes:  Positive for pain, discharge and redness. Negative for photophobia, itching and visual disturbance.       PAST MEDICAL HISTORY         Diagnosis Date    Diverticular disease 2008    Diverticulitis        SURGICALHISTORY     Patient  has a past surgical history that includes cyst removal; other surgical history (6/11/14); and Ureter stent placement (6-11-14).    CURRENT MEDICATIONS       Discharge Medication List as of 5/22/2024  9:14 AM          ALLERGIES     Patient is has No Known Allergies.    Patients There is no immunization history for the selected administration types on file for this patient.    FAMILY HISTORY     Patient's family history includes No Known Problems in his mother and sister; Other in his father.    SOCIAL HISTORY     Patient  reports that he has been smoking cigarettes. He started smoking about 25 years ago. He has a 25.4 pack-year smoking history. He has never used smokeless tobacco. He reports that he does not currently use alcohol. He reports current drug use. Frequency: 7.00 times per week. Drug: Marijuana (Weed).    PHYSICAL EXAM     ED

## 2024-05-23 ENCOUNTER — TELEPHONE (OUTPATIENT)
Dept: FAMILY MEDICINE CLINIC | Age: 39
End: 2024-05-23

## 2024-05-23 NOTE — TELEPHONE ENCOUNTER
----- Message from Binu Lerma sent at 5/23/2024 10:07 AM EDT -----  Regarding: ED Follow-up  Contact: [phone]  I think I need to get std tested to make sure I'm good

## 2024-05-28 NOTE — TELEPHONE ENCOUNTER
Spoke with pt and appointment made.           Future Appointments   Date Time Provider Department Center   6/18/2024  9:40 AM Ector Aguilar MD [facility] [place]

## 2024-06-18 ENCOUNTER — TELEPHONE (OUTPATIENT)
Dept: FAMILY MEDICINE CLINIC | Age: 39
End: 2024-06-18

## 2024-06-18 ENCOUNTER — NURSE ONLY (OUTPATIENT)
Dept: LAB | Age: 39
End: 2024-06-18

## 2024-06-18 ENCOUNTER — OFFICE VISIT (OUTPATIENT)
Dept: FAMILY MEDICINE CLINIC | Age: 39
End: 2024-06-18
Payer: COMMERCIAL

## 2024-06-18 VITALS
RESPIRATION RATE: 18 BRPM | OXYGEN SATURATION: 98 % | WEIGHT: 207 LBS | HEIGHT: 68 IN | SYSTOLIC BLOOD PRESSURE: 134 MMHG | BODY MASS INDEX: 31.37 KG/M2 | DIASTOLIC BLOOD PRESSURE: 80 MMHG | TEMPERATURE: 97.4 F | HEART RATE: 78 BPM

## 2024-06-18 DIAGNOSIS — Z11.3 SCREEN FOR STD (SEXUALLY TRANSMITTED DISEASE): Primary | ICD-10-CM

## 2024-06-18 DIAGNOSIS — K08.89 TOOTH PAIN: ICD-10-CM

## 2024-06-18 DIAGNOSIS — Z11.3 SCREEN FOR STD (SEXUALLY TRANSMITTED DISEASE): ICD-10-CM

## 2024-06-18 LAB
HAV IGM SER QL: NEGATIVE
HBV CORE IGM SERPL QL IA: NEGATIVE
HBV SURFACE AG SERPL QL IA: NEGATIVE
HCV IGG SERPL QL IA: NEGATIVE

## 2024-06-18 PROCEDURE — G8427 DOCREV CUR MEDS BY ELIG CLIN: HCPCS

## 2024-06-18 PROCEDURE — G8417 CALC BMI ABV UP PARAM F/U: HCPCS

## 2024-06-18 PROCEDURE — 99214 OFFICE O/P EST MOD 30 MIN: CPT

## 2024-06-18 PROCEDURE — 4004F PT TOBACCO SCREEN RCVD TLK: CPT

## 2024-06-18 ASSESSMENT — ENCOUNTER SYMPTOMS
CONSTIPATION: 0
NAUSEA: 0
CHEST TIGHTNESS: 0
DIARRHEA: 0
ABDOMINAL PAIN: 0
VOMITING: 0
SHORTNESS OF BREATH: 0
WHEEZING: 0

## 2024-06-18 NOTE — PROGRESS NOTES
Health Maintenance Due   Topic Date Due    Hepatitis B vaccine (1 of 3 - 3-dose series) Never done    COVID-19 Vaccine (1) Never done    Varicella vaccine (1 of 2 - 2-dose childhood series) Never done    Pneumococcal 0-64 years Vaccine (1 of 2 - PCV) Never done    DTaP/Tdap/Td vaccine (1 - Tdap) Never done

## 2024-06-18 NOTE — TELEPHONE ENCOUNTER
Dr. Aguilar    Please note that the referral to Dr. Brown office is not possible. This is due to the referral has to come from a General Dentist. Also has to have x-rays prior referral.     Attempted to let Pt know and unable to reach. Did leave message for pt to return call at earliest convenience.

## 2024-06-18 NOTE — PROGRESS NOTES
Patient:Binu Lerma  YOB: 1985  MRN: [id number]    Subjective   38 y.o. male who presents for the following: OTHER (Checking for STD)  Wanted to get an STD screen.   Had a history of epididymitis, conjunctivitis   Patient states he previously read that conjunctivitis can be caused by STIs.  Patient wanted to be screened.  He has been active in the last with 3-4 partners, 3 of which he has previously been active with. Does not wear protection.   Denies any signs and symptoms including pain, discharge or lesions     Previously discussed at last visit with epididymitis. States symptoms have improved but are still intermittently present with tenderness on right inguinal side.    At last visit was referred to an oral maxillofacial surgeon.   HPI  Review of Systems   Review of Systems   Constitutional:  Negative for chills, diaphoresis and fever.   HENT:  Negative for congestion.    Respiratory:  Negative for chest tightness, shortness of breath and wheezing.    Cardiovascular:  Negative for chest pain and leg swelling.   Gastrointestinal:  Negative for abdominal pain, constipation, diarrhea, nausea and vomiting.   Genitourinary:  Negative for difficulty urinating and dysuria.   Musculoskeletal:  Negative for arthralgias and myalgias.   Skin:  Negative for rash.   Neurological:  Negative for syncope, light-headedness and headaches.   Psychiatric/Behavioral: Negative.       Patient History    Past Medical History:  He has a past medical history of Diverticular disease and Diverticulitis.    Social History:  He reports that he has been smoking cigarettes. He started smoking about 25 years ago. He has a 25.5 pack-year smoking history. He has never used smokeless tobacco. He reports that he does not currently use alcohol. He reports current drug use. Frequency: 7.00 times per week. Drug: Marijuana (Weed).     Past Surgical History:   Procedure Laterality Date    CYST REMOVAL      neck    OTHER SURGICAL

## 2024-06-19 LAB
CHLAMYDIA DNA UR QL NAA+PROBE: NEGATIVE
CHLAMYDIA, GC DNA AMP, URINE: NORMAL
HIV 1+2 AB+HIV1 P24 AG SERPL QL IA: NORMAL
N GONORRHOEA DNA UR QL NAA+PROBE: NEGATIVE
RPR SER QL: NONREACTIVE

## 2024-10-15 ENCOUNTER — TELEPHONE (OUTPATIENT)
Dept: FAMILY MEDICINE CLINIC | Age: 39
End: 2024-10-15

## 2024-10-15 NOTE — TELEPHONE ENCOUNTER
----- Message from Jr CHRISTIE sent at 10/15/2024 12:20 PM EDT -----  Regarding: [facility] Referral Request  [facility] Referral Request    Reason for referral request: Lab/Test Order    Specialist/Lab/Test patient is requesting (if known): Ultrasound    Specialist Phone Number (if applicable):    Additional Information : Patient wanted to schedule an ultrasound appointment and he needs an order.   --------------------------------------------------------------------------------------------------------------------------    Relationship to Patient: Self     Call Back Information: OK to leave message on voicemail  Preferred Call Back Number: Phone [phone]

## 2024-10-28 ENCOUNTER — OFFICE VISIT (OUTPATIENT)
Dept: FAMILY MEDICINE CLINIC | Age: 39
End: 2024-10-28
Payer: COMMERCIAL

## 2024-10-28 VITALS
DIASTOLIC BLOOD PRESSURE: 84 MMHG | OXYGEN SATURATION: 97 % | HEART RATE: 82 BPM | WEIGHT: 208.2 LBS | SYSTOLIC BLOOD PRESSURE: 128 MMHG | RESPIRATION RATE: 18 BRPM | TEMPERATURE: 97.8 F | BODY MASS INDEX: 31.55 KG/M2 | HEIGHT: 68 IN

## 2024-10-28 DIAGNOSIS — N50.811 PAIN IN RIGHT TESTICLE: Primary | ICD-10-CM

## 2024-10-28 PROCEDURE — G8427 DOCREV CUR MEDS BY ELIG CLIN: HCPCS

## 2024-10-28 PROCEDURE — 99213 OFFICE O/P EST LOW 20 MIN: CPT

## 2024-10-28 PROCEDURE — 4004F PT TOBACCO SCREEN RCVD TLK: CPT

## 2024-10-28 PROCEDURE — G8484 FLU IMMUNIZE NO ADMIN: HCPCS

## 2024-10-28 PROCEDURE — G8417 CALC BMI ABV UP PARAM F/U: HCPCS

## 2024-10-28 ASSESSMENT — ENCOUNTER SYMPTOMS: ABDOMINAL PAIN: 0

## 2024-10-28 NOTE — PROGRESS NOTES
[facility] [place] [facility]  [address]  [facility] [address]  Dept: [phone]  Dept Fax: [phone]  Loc: [phone]      Binu Lerma is a 39 y.o. male who presents todayfor Testicle Pain (Patient was dead lifting in March/April and got an infection. States feels like now \"something is there\" that shouldn't be there. )      :     Acute care visit secondary to scrotal pain.    Of note patient was last seen on 6/18/2024 by PCP, during that examination it was noted that patient has a history of epididymitis.  He states that he would intermittently have tenderness on the right inguinal side.  At that visit STI testing was done and all negative.    Today:  Patient was seen previously as stated above, very unclear history, states that he was dead lifting in March or April and was told he had hernia but then also was told that he had epididymitis and was treated with antibiotic.  No ultrasound was ever done.  Patient states today that he still having pain, not the same pain he had when he had the epididymitis but is uncertain if this is still related to when he was dead lifting.  On physical exam has no signs of any hernia.  He states that he can feel something specifically on the right testicle, on my physical exam I was unable to palpate anything today.  He is agreeable to proceeding with ultrasound that was ordered today.  Patient states that the pain has improved over the last week but is worse when there is any pressure on his right testicle.    patient is allergic to seasonal.    Past MedicalHistory  Binu  has a past medical history of Diverticular disease and Diverticulitis.    Past Surgical History  The patient  has a past surgical history that includes cyst removal; other surgical history (6/11/14); and Ureter stent placement (6-11-14).    Family History  This patient's family history includes No Known Problems in his mother and 
case, including pertinent history and exam findings with the resident.  I agree with the documented assessment and plan as documented by the resident.    SULEIMAN Brown Jr., DO 10/28/2024 12:00 PM    
Patient

## 2024-10-31 ENCOUNTER — HOSPITAL ENCOUNTER (OUTPATIENT)
Dept: ULTRASOUND IMAGING | Age: 39
Discharge: HOME | End: 2024-10-31
Payer: COMMERCIAL

## 2024-10-31 DIAGNOSIS — N50.811 PAIN IN RIGHT TESTICLE: ICD-10-CM

## 2024-10-31 PROCEDURE — 76870 US EXAM SCROTUM: CPT

## 2024-11-01 ENCOUNTER — TELEPHONE (OUTPATIENT)
Dept: FAMILY MEDICINE CLINIC | Age: 39
End: 2024-11-01

## 2024-11-01 DIAGNOSIS — N50.9 TESTICULAR ABNORMALITY: Primary | ICD-10-CM

## 2024-11-01 NOTE — TELEPHONE ENCOUNTER
Timothy called patient discussed recent ultrasound findings and referral for urology was placed.  If we could work on getting an appointment set up for him through [facility] office that would be great.  Thank you.

## 2024-11-01 NOTE — TELEPHONE ENCOUNTER
Called to discuss results of scrotum/testicle ultrasound.  This ultrasound did demonstrate a 1.5 cm heterogeneous structure adjacent to the left testicle of indeterminate etiology, malignancy cannot be excluded.  And a small left-sided hydrocele.    This was all discussed with patient, he stated that [facility] my call.  He states that he is in agreement with a referral to urology at this time, that referral has been placed.    Patient had no other questions or concerns at this time.

## 2024-11-14 ENCOUNTER — OFFICE VISIT (OUTPATIENT)
Dept: UROLOGY | Age: 39
End: 2024-11-14
Payer: COMMERCIAL

## 2024-11-14 VITALS
BODY MASS INDEX: 31.83 KG/M2 | DIASTOLIC BLOOD PRESSURE: 72 MMHG | WEIGHT: 210 LBS | SYSTOLIC BLOOD PRESSURE: 132 MMHG | HEIGHT: 68 IN

## 2024-11-14 DIAGNOSIS — N45.1 BILATERAL EPIDIDYMITIS: Primary | ICD-10-CM

## 2024-11-14 PROCEDURE — G8417 CALC BMI ABV UP PARAM F/U: HCPCS | Performed by: UROLOGY

## 2024-11-14 PROCEDURE — G8427 DOCREV CUR MEDS BY ELIG CLIN: HCPCS | Performed by: UROLOGY

## 2024-11-14 PROCEDURE — 99204 OFFICE O/P NEW MOD 45 MIN: CPT | Performed by: UROLOGY

## 2024-11-14 PROCEDURE — G8484 FLU IMMUNIZE NO ADMIN: HCPCS | Performed by: UROLOGY

## 2024-11-14 PROCEDURE — 4004F PT TOBACCO SCREEN RCVD TLK: CPT | Performed by: UROLOGY

## 2024-11-14 RX ORDER — DOXYCYCLINE 100 MG/1
100 CAPSULE ORAL 2 TIMES DAILY
Qty: 60 CAPSULE | Refills: 0 | Status: SHIPPED | OUTPATIENT
Start: 2024-11-14 | End: 2024-12-14

## 2024-11-14 RX ORDER — IBUPROFEN 800 MG/1
800 TABLET, FILM COATED ORAL 2 TIMES DAILY PRN
Qty: 60 TABLET | Refills: 0 | Status: SHIPPED | OUTPATIENT
Start: 2024-11-14

## 2024-11-14 NOTE — PROGRESS NOTES
Aston Corcoran MD.    [facility] [place] [facility]  [facility]  [address]  [address]  [facility] [address]  Dept: [phone]  Dept Fax: [phone]  Loc: [phone]    [facility] Urology Office Note -     Patient:  Binu Lerma  YOB: 1985    The patient is a 39 y.o. male who presents today for evaluation of the following problems:   Chief Complaint   Patient presents with    New Patient     Testicular abnormality, patient states he is in pain in his testicles around an 8 at its highest.    referred/consultation requested by Ector Aguilar MD.    History of Present Illness:    Testicular abnormality  With associated pain   Pain started after deadlifting  Abnormality on the left side---- had epididymitis on this side in the past  Hx of undescended testicle in the past  Pt is sexually active      Requested/reviewed records from Ector Aguilar MD office and/or outside physician/EMR    (Patient's old records have been requested, reviewed and pertinent findings summarized in today's note.)    Procedures Today: N/A      Last several PSA's:  No results found for: \"PSA\"    Last total testosterone:  No results found for: \"TESTOSTERONE\"    Urinalysis today:  No results found for this visit on 11/14/24.    Last BUN and creatinine:  Lab Results   Component Value Date    BUN 12 04/26/2022     Lab Results   Component Value Date    CREATININE 0.9 04/26/2022         Imaging Reviewed during this Office Visit:   ASTON CORCORAN MD independently reviewed the images and verified the radiology reports from:    US SCROTUM AND TESTICLES    Result Date: 10/31/2024  PROCEDURE: US SCROTUM AND TESTICLES CLINICAL INFORMATION: Right testicle pain TECHNIQUE: Ultrasound of the scrotum was performed. Grayscale and color Doppler images with spectral analysis were obtained. COMPARISON: None FINDINGS: B-mode study: The right testicle measures 3.6 x 3.5 x 2.4 cm and the left testicle measures 4.0 x

## 2024-11-15 ENCOUNTER — LAB (OUTPATIENT)
Dept: LAB | Age: 39
End: 2024-11-15

## 2024-11-15 DIAGNOSIS — N45.1 BILATERAL EPIDIDYMITIS: ICD-10-CM

## 2024-11-15 LAB — LDH SERPL L TO P-CCNC: 124 U/L (ref 100–190)

## 2024-11-16 LAB — AFP SERPL-MCNC: < 1.8 UG/L

## 2024-11-19 LAB — B-HCG SERPL-ACNC: < 1 IU/L (ref 0–3)

## 2025-01-02 ENCOUNTER — OFFICE VISIT (OUTPATIENT)
Dept: FAMILY MEDICINE CLINIC | Age: 40
End: 2025-01-02

## 2025-01-02 VITALS
RESPIRATION RATE: 18 BRPM | HEART RATE: 85 BPM | BODY MASS INDEX: 31.37 KG/M2 | WEIGHT: 207 LBS | SYSTOLIC BLOOD PRESSURE: 112 MMHG | OXYGEN SATURATION: 95 % | TEMPERATURE: 97.2 F | HEIGHT: 68 IN | DIASTOLIC BLOOD PRESSURE: 70 MMHG

## 2025-01-02 DIAGNOSIS — N50.9 TESTICULAR ABNORMALITY: ICD-10-CM

## 2025-01-02 DIAGNOSIS — N50.811 PAIN IN RIGHT TESTICLE: Primary | ICD-10-CM

## 2025-01-02 ASSESSMENT — PATIENT HEALTH QUESTIONNAIRE - PHQ9
2. FEELING DOWN, DEPRESSED OR HOPELESS: NOT AT ALL
1. LITTLE INTEREST OR PLEASURE IN DOING THINGS: SEVERAL DAYS
SUM OF ALL RESPONSES TO PHQ QUESTIONS 1-9: 1
SUM OF ALL RESPONSES TO PHQ9 QUESTIONS 1 & 2: 1

## 2025-01-03 NOTE — PROGRESS NOTES
S: 39 y.o. male with   Chief Complaint   Patient presents with    Hernia       Chief complaint, [place], and all pertinent details of the case reviewed with the resident.    Please see resident's note for specific details discussed at today's visit.  Just finished 1 month of doxy for epidydimitis  BP Readings from Last 3 Encounters:   01/02/25 112/70   11/14/24 132/72   10/28/24 128/84     Wt Readings from Last 3 Encounters:   01/02/25 93.9 kg (207 lb)   11/14/24 95.3 kg (210 lb)   10/28/24 94.4 kg (208 lb 3.2 oz)       O: VS:  height is 1.727 m (5' 8\") and weight is 93.9 kg (207 lb). His temporal temperature is 97.2 °F (36.2 °C). His blood pressure is 112/70 and his pulse is 85. His respiration is 18 and oxygen saturation is 95%.   Gen: NAD and alert  LDH and markers for testicular Ca all wnl  A:     Diagnosis Orders   1. Pain in right testicle  CBC with Auto Differential    Basic Metabolic Panel    Urinalysis with Microscopic      2. Testicular abnormality  CBC with Auto Differential          Plan:  Check UA  , cbc, BMP.  Encourage STD testing also.  Get   US on 1/6, consider surgery or urology referral depending on results    Health Maintenance Due   Topic Date Due    Pneumococcal 0-64 years Vaccine (1 of 2 - PCV) Never done    Varicella vaccine (1 of 2 - 13+ 2-dose series) Never done    Hepatitis B vaccine (1 of 3 - 19+ 3-dose series) Never done    DTaP/Tdap/Td vaccine (1 - Tdap) Never done    Flu vaccine (1) Never done    COVID-19 Vaccine (1 - 2023-24 season) Never done       Attending Physician Statement  I have discussed the case, including pertinent history and exam findings with the resident. I also have seen the patient and performed key portions of the examination.  I agree with the documented assessment and plan as documented by the resident.        Yanni Wallace MD 1/3/2025 8:59 AM    
cm. Both testicles are homogeneous in  echotexture. The bilateral epididymides are normal. There is no hydrocele.    Doppler study: There is color Doppler flow with arterial inflow and venous  outflow in both testicles and in the extratesticular structures. There is  slightly increased flow to the left epididymis. There is no Doppler evidence of  torsion.  Impression: 1. Normal bilateral testicular ultrasound.  2. Hypervascularity of the left epididymis suspicious for epididymitis.    **This report has been created using voice recognition software. It may contain  minor errors which are inherent in voice recognition technology.**    Electronically signed by Dr. Aramis De La Garza    Data   I have reviewed: medication list, notes from last encounter    BP Readings from Last 3 Encounters:   01/02/25 112/70   11/14/24 132/72   10/28/24 128/84     Wt Readings from Last 3 Encounters:   01/02/25 93.9 kg (207 lb)   11/14/24 95.3 kg (210 lb)   10/28/24 94.4 kg (208 lb 3.2 oz)     There is no immunization history for the selected administration types on file for this patient.  Health Maintenance   Topic Date Due    Pneumococcal 0-64 years Vaccine (1 of 2 - PCV) Never done    Varicella vaccine (1 of 2 - 13+ 2-dose series) Never done    Hepatitis B vaccine (1 of 3 - 19+ 3-dose series) Never done    DTaP/Tdap/Td vaccine (1 - Tdap) Never done    Flu vaccine (1) Never done    COVID-19 Vaccine (1 - 2023-24 season) Never done    Depression Screen  01/02/2026    Hepatitis C screen  Completed    HIV screen  Completed    Hepatitis A vaccine  Aged Out    Hib vaccine  Aged Out    HPV vaccine  Aged Out    Polio vaccine  Aged Out    Meningococcal (ACWY) vaccine  Aged Out     The ASCVD Risk score (Marie MYERS, et al., 2019) failed to calculate for the following reasons:    The 2019 ASCVD risk score is only valid for ages 40 to 79        1/2/2025    10:08 AM 4/12/2024    10:56 AM 10/12/2021     1:13 PM 8/2/2019     7:51 AM   PHQ Scores   PHQ2

## 2025-01-06 ENCOUNTER — HOSPITAL ENCOUNTER (OUTPATIENT)
Dept: ULTRASOUND IMAGING | Age: 40
Discharge: HOME | End: 2025-01-06
Attending: UROLOGY
Payer: COMMERCIAL

## 2025-01-06 DIAGNOSIS — N45.1 BILATERAL EPIDIDYMITIS: ICD-10-CM

## 2025-01-06 PROCEDURE — 76870 US EXAM SCROTUM: CPT

## 2025-01-09 ENCOUNTER — OFFICE VISIT (OUTPATIENT)
Dept: UROLOGY | Age: 40
End: 2025-01-09

## 2025-01-09 VITALS — RESPIRATION RATE: 10 BRPM | HEIGHT: 68 IN | WEIGHT: 207 LBS | BODY MASS INDEX: 31.37 KG/M2

## 2025-01-09 DIAGNOSIS — R10.30 LOWER ABDOMINAL PAIN: ICD-10-CM

## 2025-01-09 DIAGNOSIS — N45.1 LEFT EPIDIDYMITIS: Primary | ICD-10-CM

## 2025-01-09 NOTE — PROGRESS NOTES
check         Lower abdominal pain   -Follow up with PCP to discuss further options           No follow-ups on file.           --JAMI Paez - CNP   Urology    An electronic signature was used to authenticate this note.

## 2025-02-01 ENCOUNTER — HOSPITAL ENCOUNTER (EMERGENCY)
Age: 40
Discharge: HOME | End: 2025-02-01
Payer: COMMERCIAL

## 2025-02-01 ENCOUNTER — APPOINTMENT (OUTPATIENT)
Dept: GENERAL RADIOLOGY | Age: 40
End: 2025-02-01
Payer: COMMERCIAL

## 2025-02-01 VITALS
RESPIRATION RATE: 18 BRPM | SYSTOLIC BLOOD PRESSURE: 150 MMHG | DIASTOLIC BLOOD PRESSURE: 86 MMHG | OXYGEN SATURATION: 99 % | HEART RATE: 86 BPM | TEMPERATURE: 97.9 F | WEIGHT: 210 LBS | HEIGHT: 68 IN | BODY MASS INDEX: 31.83 KG/M2

## 2025-02-01 DIAGNOSIS — R03.0 ELEVATED BLOOD PRESSURE READING: ICD-10-CM

## 2025-02-01 DIAGNOSIS — L50.9 URTICARIA: Primary | ICD-10-CM

## 2025-02-01 DIAGNOSIS — F17.210 CIGARETTE SMOKER: ICD-10-CM

## 2025-02-01 DIAGNOSIS — J06.9 VIRAL URI WITH COUGH: ICD-10-CM

## 2025-02-01 LAB
FLUAV RNA RESP QL NAA+PROBE: NOT DETECTED
FLUBV RNA RESP QL NAA+PROBE: NOT DETECTED
SARS-COV-2 RNA RESP QL NAA+PROBE: NOT DETECTED

## 2025-02-01 PROCEDURE — 71046 X-RAY EXAM CHEST 2 VIEWS: CPT

## 2025-02-01 PROCEDURE — 99284 EMERGENCY DEPT VISIT MOD MDM: CPT

## 2025-02-01 PROCEDURE — 87636 SARSCOV2 & INF A&B AMP PRB: CPT

## 2025-02-01 PROCEDURE — 6370000000 HC RX 637 (ALT 250 FOR IP): Performed by: PHYSICIAN ASSISTANT

## 2025-02-01 RX ORDER — PREDNISONE 50 MG/1
50 TABLET ORAL DAILY
Qty: 5 TABLET | Refills: 0 | Status: SHIPPED | OUTPATIENT
Start: 2025-02-01 | End: 2025-02-06

## 2025-02-01 RX ORDER — FAMOTIDINE 20 MG/1
40 TABLET, FILM COATED ORAL ONCE
Status: COMPLETED | OUTPATIENT
Start: 2025-02-01 | End: 2025-02-01

## 2025-02-01 RX ORDER — FAMOTIDINE 20 MG/1
20 TABLET, FILM COATED ORAL 2 TIMES DAILY
Qty: 10 TABLET | Refills: 0 | Status: SHIPPED | OUTPATIENT
Start: 2025-02-01 | End: 2025-02-06

## 2025-02-01 RX ORDER — PREDNISONE 20 MG/1
60 TABLET ORAL ONCE
Status: COMPLETED | OUTPATIENT
Start: 2025-02-01 | End: 2025-02-01

## 2025-02-01 RX ADMIN — PREDNISONE 60 MG: 20 TABLET ORAL at 00:38

## 2025-02-01 RX ADMIN — FAMOTIDINE 40 MG: 20 TABLET, FILM COATED ORAL at 00:38

## 2025-02-01 NOTE — ED PROVIDER NOTES
[facility] EMERGENCY DEPARTMENT      EMERGENCY MEDICINE     Pt Name: Binu Lerma  MRN: [id number]  Birthdate 1985  Date of evaluation: 2/1/2025  Provider: MERRILL Salgado    CHIEF COMPLAINT       Chief Complaint   Patient presents with    Urticaria     HISTORY OF PRESENT ILLNESS   Binu Lerma is a pleasant 39 y.o. male who presents to the emergency department from home complaining of a rash which started earlier today.  Patient states it seemed like hives.  Complaint of itching.  No difficulty breathing.  No difficulty swallowing.  No oral swelling.  No chest pain or abdominal pain.  No nausea or vomiting.    Patient states that he has had a runny nose, cough which is productive for sputum since earlier this week.  He was taking over-the-counter DayQuil with honey.  Thought he might be reacting to something in that.  Although he has had honey before.  No antibiotics.    According to the patient his son also had an episode of urticaria earlier in the week and also has similar illness.    Patient took 2 over-the-counter Benadryl prior to arrival.    Denying any significant past medical history.  Patient does have a history of smoking.    Patient denies any new medications.  No new exposures.  No new detergents.  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 Leal    URETER STENT PLACEMENT  6-11-14    Dr. Lerma        CURRENT MEDICATIONS       Previous Medications    IBUPROFEN (ADVIL;MOTRIN) 800 MG TABLET    Take 1 tablet by mouth 2 times

## 2025-02-01 NOTE — DISCHARGE INSTRUCTIONS
Continue the over-the-counter Benadryl as directed on the bottle for itching.  You can take the prednisone and the famotidine as directed.    Call 8 AM Monday morning to schedule follow-up appoint with your regular physician.    Your blood pressure was also elevated here at 150/86.  This should be monitored by your regular doctor.    Discharge warning    Please remember that examination and testing performed in the emergency department is not a comprehensive evaluation of all medical conditions and does not replace the need to follow up with your primary care provider.  In the emergency department, we are only able to evaluate your symptoms in the current condition, but symptoms may change or worsen.  Although you are felt safe to be discharged today, if your symptoms persist or change, you need to be re-evaluated by your regular/primary care doctor as soon as possible.  If you are unable to make appointment with your regular doctor, please come back to the ER to be re-evaluated.

## 2025-02-01 NOTE — ED TRIAGE NOTES
Pt presents to ED via lobby for evaluation of hives. Pt states onset today. Pt denies any new environmental changes. Pt states last benadryl taken at 1700 with some relief. Denies CP or SOB. Vitals obtained.

## 2025-02-15 ENCOUNTER — HOSPITAL ENCOUNTER (EMERGENCY)
Age: 40
Discharge: HOME | End: 2025-02-15
Payer: COMMERCIAL

## 2025-02-15 VITALS
OXYGEN SATURATION: 96 % | TEMPERATURE: 97.7 F | HEART RATE: 80 BPM | SYSTOLIC BLOOD PRESSURE: 142 MMHG | DIASTOLIC BLOOD PRESSURE: 89 MMHG | RESPIRATION RATE: 18 BRPM

## 2025-02-15 DIAGNOSIS — Z71.1 FEARED COMPLAINT WITHOUT DIAGNOSIS: Primary | ICD-10-CM

## 2025-02-15 DIAGNOSIS — J02.9 SORE THROAT: ICD-10-CM

## 2025-02-15 DIAGNOSIS — Z87.2 HX OF URTICARIA: ICD-10-CM

## 2025-02-15 PROCEDURE — 99214 OFFICE O/P EST MOD 30 MIN: CPT | Performed by: NURSE PRACTITIONER

## 2025-02-15 PROCEDURE — 99213 OFFICE O/P EST LOW 20 MIN: CPT

## 2025-02-15 RX ORDER — AMOXICILLIN 500 MG/1
500 CAPSULE ORAL 2 TIMES DAILY
Qty: 20 CAPSULE | Refills: 0 | Status: SHIPPED | OUTPATIENT
Start: 2025-02-15 | End: 2025-02-25

## 2025-02-15 ASSESSMENT — ENCOUNTER SYMPTOMS
NAUSEA: 0
DIARRHEA: 0
VOMITING: 0
SINUS PRESSURE: 0
SORE THROAT: 1
SHORTNESS OF BREATH: 0
ABDOMINAL PAIN: 0
WHEEZING: 0
COUGH: 0

## 2025-02-15 ASSESSMENT — PAIN - FUNCTIONAL ASSESSMENT: PAIN_FUNCTIONAL_ASSESSMENT: NONE - DENIES PAIN

## 2025-02-15 NOTE — ED PROVIDER NOTES
[facility] URGENT CARE  UrgentCare Encounter      CHIEFCOMPLAINT       Chief Complaint   Patient presents with    Urticaria       Nurses Notes reviewed and I agree except as noted in the HPI.  HISTORY OF PRESENT ILLNESS     Binu Lerma is a 39 y.o. male who presents to the urgent care for evaluation.  He states that at the present time he has no signs or symptoms of hives.  However he wants to know why they keep returning from time to time since initial onset on 2/1/2025 when he was seen in the emergency department he does not know what triggers it.  He also feels like he has \"microscopic bugs crawling all over him\".  Denies itching. Has a sore throat.     The patient/patient representative has no other acute complaints at this time.    REVIEW OF SYSTEMS     Review of Systems   Constitutional:  Negative for chills and fever.   HENT:  Positive for sore throat. Negative for congestion, ear pain and sinus pressure.    Respiratory:  Negative for cough, shortness of breath and wheezing.    Cardiovascular:  Negative for chest pain.   Gastrointestinal:  Negative for abdominal pain, diarrhea, nausea and vomiting.   Skin:  Negative for rash.   Allergic/Immunologic: Negative for environmental allergies and food allergies.       PAST MEDICAL HISTORY         Diagnosis Date    Diverticular disease 2008    Diverticulitis        SURGICAL HISTORY     Patient  has a past surgical history that includes cyst removal; other surgical history (6/11/14); and Ureter stent placement (6-11-14).    CURRENT MEDICATIONS       Previous Medications    IBUPROFEN (ADVIL;MOTRIN) 800 MG TABLET    Take 1 tablet by mouth 2 times daily as needed for Pain       ALLERGIES     Patient is is allergic to seasonal.    FAMILY HISTORY     Patient'sfamily history includes No Known Problems in his mother and sister; Other in his father.    SOCIAL HISTORY     Patient  reports that he has been smoking cigarettes. He started smoking about 26 years ago. He

## 2025-02-15 NOTE — ED TRIAGE NOTES
Was seen in ED about 2 weeks ago and treated at that time for hives with steroids(prednisone), around 5 days ago hives returned, and son also has hives now(he just finished a coarse of antibiotic)

## 2025-05-13 ENCOUNTER — OFFICE VISIT (OUTPATIENT)
Dept: UROLOGY | Age: 40
End: 2025-05-13
Payer: COMMERCIAL

## 2025-05-13 VITALS — WEIGHT: 210 LBS | HEIGHT: 68 IN | BODY MASS INDEX: 31.83 KG/M2 | RESPIRATION RATE: 12 BRPM

## 2025-05-13 DIAGNOSIS — N45.1 LEFT EPIDIDYMITIS: Primary | ICD-10-CM

## 2025-05-13 DIAGNOSIS — N50.812 PAIN IN BOTH TESTICLES: ICD-10-CM

## 2025-05-13 DIAGNOSIS — R10.30 LOWER ABDOMINAL PAIN: ICD-10-CM

## 2025-05-13 DIAGNOSIS — N50.811 PAIN IN BOTH TESTICLES: ICD-10-CM

## 2025-05-13 DIAGNOSIS — N53.12 PAINFUL EJACULATION: ICD-10-CM

## 2025-05-13 PROCEDURE — 99213 OFFICE O/P EST LOW 20 MIN: CPT

## 2025-05-13 PROCEDURE — G8417 CALC BMI ABV UP PARAM F/U: HCPCS

## 2025-05-13 PROCEDURE — 4004F PT TOBACCO SCREEN RCVD TLK: CPT

## 2025-05-13 PROCEDURE — G8428 CUR MEDS NOT DOCUMENT: HCPCS

## 2025-05-13 ASSESSMENT — ENCOUNTER SYMPTOMS: ABDOMINAL PAIN: 0

## 2025-05-13 NOTE — PATIENT INSTRUCTIONS
Please call the office at [phone] if you have any questions or concerns following your visit. If you were prescribed a new medication and have questions, feel free to call. For any emergent issues, please go to the nearest emergency room for further evaluation.

## 2025-05-13 NOTE — PROGRESS NOTES
[facility] [place] [facility]  [facility] UROLOGY  [address]  [address]  [facility] [address]  Dept: [phone]  Loc: [phone]    Visit Date: 2025    History of Present Illness  Binu Lerma (:  1985) is a 39 y.o. male,Established patient, here for evaluation of the following chief complaint(s):  Follow-up (Left epididymitis )    Patient initially seen 24 for testicular pain following dead lifting. Was found to have bilateral epididymitis and treated with doxy for 1 month. Patient with known history of left epididymitis and undescended testicle. He is sexually active with STI testing negative.    Last visit patient reports testicular pain is resolved following antibiotics. He reports most of his pain is his left lower abdomen. Patient is concerned with hernia as he has family hx. Tumor markers reviewed and are negative. Repeat Scrotal US showing residual left epididymitis.     Today he reports symptoms are well. He had bad experience with family doctor so did not follow up. Has some intermittent dull lower abdominal pain/scrotal pain and some pain with ejaculation. Symptoms not severe.    Current Outpatient Medications   Medication Sig Dispense Refill    ibuprofen (ADVIL;MOTRIN) 800 MG tablet Take 1 tablet by mouth 2 times daily as needed for Pain 60 tablet 0     No current facility-administered medications for this visit.       Past Medical History  Binu  has a past medical history of Diverticular disease and Diverticulitis.    Past Surgical History  The patient  has a past surgical history that includes cyst removal; other surgical history (14); and Ureter stent placement (14).    Family History  This patient's family history includes No Known Problems in his mother and sister; Other in his father.    Social History  Binu  reports that he has been smoking cigarettes. He started smoking about 26 years ago. He has a 26.4 pack-year smoking